# Patient Record
Sex: FEMALE | Race: WHITE | NOT HISPANIC OR LATINO | Employment: UNEMPLOYED | URBAN - METROPOLITAN AREA
[De-identification: names, ages, dates, MRNs, and addresses within clinical notes are randomized per-mention and may not be internally consistent; named-entity substitution may affect disease eponyms.]

---

## 2018-12-10 ENCOUNTER — NURSE ONLY (OUTPATIENT)
Dept: PEDIATRICS | Age: 33
End: 2018-12-10

## 2018-12-10 DIAGNOSIS — Z23 NEED FOR VACCINATION: Primary | ICD-10-CM

## 2018-12-10 PROCEDURE — 90688 IIV4 VACCINE SPLT 0.5 ML IM: CPT | Performed by: PEDIATRICS

## 2020-09-29 ENCOUNTER — INITIAL PRENATAL (OUTPATIENT)
Dept: OBSTETRICS AND GYNECOLOGY | Age: 35
End: 2020-09-29

## 2020-09-29 VITALS — DIASTOLIC BLOOD PRESSURE: 68 MMHG | WEIGHT: 150 LBS | SYSTOLIC BLOOD PRESSURE: 110 MMHG

## 2020-09-29 DIAGNOSIS — K64.4 EXTERNAL HEMORRHOIDS: ICD-10-CM

## 2020-09-29 DIAGNOSIS — R19.5 LOOSE STOOLS: ICD-10-CM

## 2020-09-29 DIAGNOSIS — Z34.81 PRENATAL CARE, SUBSEQUENT PREGNANCY, FIRST TRIMESTER: Primary | ICD-10-CM

## 2020-09-29 LAB
GLUCOSE UR STRIP-MCNC: NEGATIVE MG/DL
PROT UR STRIP-MCNC: NEGATIVE MG/DL

## 2020-09-29 PROCEDURE — 99203 OFFICE O/P NEW LOW 30 MIN: CPT | Performed by: OBSTETRICS & GYNECOLOGY

## 2020-09-29 RX ORDER — PRENATAL VIT NO.126/IRON/FOLIC 28MG-0.8MG
TABLET ORAL DAILY
COMMUNITY

## 2020-09-29 NOTE — PROGRESS NOTES
Chief Complaint   Patient presents with   • Initial Prenatal Visit     NOB:lmp 7/10/20     HPI- Pt is 34 y.o.  at 11w4d here for prenatal visit.     ROS-     - No vaginal bleeding    GI- No abdominal pain    /68   Wt 68 kg (150 lb)   LMP 07/10/2020   Exam - See flow sheet    Fetal heart rate is normal    Assessment-  Diagnoses and all orders for this visit:    Prenatal care, subsequent pregnancy, first trimester  -     Urine Culture - Urine, Urine, Clean Catch  -     POC Urinalysis Dipstick  -     OB Panel With HIV  -     Progesterone  -     HCG, B-subunit, Quantitative  -     IGP, Apt HPV,rfx 16 / 18,45    Other orders  -     prenatal vitamin (prenatal, CLASSIC, vitamin) tablet; Take  by mouth Daily.      Overall healthy 34-year-old multiparous white female who is approximately 11 weeks.  Handheld ultrasound showed fetus with cardiac activity.  We will check new OB lab work continue prenatal vitamins.  Check a first trimester ultrasound at next visit.    Patient's had a lot of gastrointestinal issues for the past year and a half would like to see a gastroenterologist before the pregnancy progresses too far and those referrals have been placed.

## 2020-09-30 ENCOUNTER — TELEPHONE (OUTPATIENT)
Dept: OBSTETRICS AND GYNECOLOGY | Age: 35
End: 2020-09-30

## 2020-09-30 LAB
ABO GROUP BLD: NORMAL
BASOPHILS # BLD AUTO: 0 X10E3/UL (ref 0–0.2)
BASOPHILS NFR BLD AUTO: 0 %
BLD GP AB SCN SERPL QL: NEGATIVE
EOSINOPHIL # BLD AUTO: 0.1 X10E3/UL (ref 0–0.4)
EOSINOPHIL NFR BLD AUTO: 2 %
ERYTHROCYTE [DISTWIDTH] IN BLOOD BY AUTOMATED COUNT: 14.2 % (ref 11.7–15.4)
HBV SURFACE AG SERPL QL IA: NEGATIVE
HCG INTACT+B SERPL-ACNC: NORMAL MIU/ML
HCT VFR BLD AUTO: 36.2 % (ref 34–46.6)
HCV AB S/CO SERPL IA: <0.1 S/CO RATIO (ref 0–0.9)
HGB BLD-MCNC: 12.2 G/DL (ref 11.1–15.9)
HIV 1+2 AB+HIV1 P24 AG SERPL QL IA: NON REACTIVE
IMM GRANULOCYTES # BLD AUTO: 0 X10E3/UL (ref 0–0.1)
IMM GRANULOCYTES NFR BLD AUTO: 0 %
LYMPHOCYTES # BLD AUTO: 1.1 X10E3/UL (ref 0.7–3.1)
LYMPHOCYTES NFR BLD AUTO: 17 %
MCH RBC QN AUTO: 29 PG (ref 26.6–33)
MCHC RBC AUTO-ENTMCNC: 33.7 G/DL (ref 31.5–35.7)
MCV RBC AUTO: 86 FL (ref 79–97)
MONOCYTES # BLD AUTO: 0.5 X10E3/UL (ref 0.1–0.9)
MONOCYTES NFR BLD AUTO: 7 %
NEUTROPHILS # BLD AUTO: 4.9 X10E3/UL (ref 1.4–7)
NEUTROPHILS NFR BLD AUTO: 74 %
PLATELET # BLD AUTO: 202 X10E3/UL (ref 150–450)
PROGEST SERPL-MCNC: 23.9 NG/ML
RBC # BLD AUTO: 4.21 X10E6/UL (ref 3.77–5.28)
RH BLD: POSITIVE
RPR SER QL: NON REACTIVE
RUBV IGG SERPL IA-ACNC: 2.7 INDEX
WBC # BLD AUTO: 6.6 X10E3/UL (ref 3.4–10.8)

## 2020-09-30 NOTE — TELEPHONE ENCOUNTER
----- Message from Basim Mead MD sent at 9/30/2020  2:15 PM EDT -----  Please notify pt. That labs are with in normal limits

## 2020-10-01 LAB
BACTERIA UR CULT: NO GROWTH
BACTERIA UR CULT: NORMAL
CYTOLOGIST CVX/VAG CYTO: NORMAL
CYTOLOGY CVX/VAG DOC CYTO: NORMAL
CYTOLOGY CVX/VAG DOC THIN PREP: NORMAL
DX ICD CODE: NORMAL
HIV 1 & 2 AB SER-IMP: NORMAL
HPV I/H RISK 4 DNA CVX QL PROBE+SIG AMP: NEGATIVE
OTHER STN SPEC: NORMAL
STAT OF ADQ CVX/VAG CYTO-IMP: NORMAL

## 2020-10-28 ENCOUNTER — ROUTINE PRENATAL (OUTPATIENT)
Dept: OBSTETRICS AND GYNECOLOGY | Age: 35
End: 2020-10-28

## 2020-10-28 VITALS — SYSTOLIC BLOOD PRESSURE: 114 MMHG | WEIGHT: 155 LBS | DIASTOLIC BLOOD PRESSURE: 66 MMHG

## 2020-10-28 DIAGNOSIS — Z86.16 HISTORY OF 2019 NOVEL CORONAVIRUS DISEASE (COVID-19): ICD-10-CM

## 2020-10-28 DIAGNOSIS — Z34.82 PRENATAL CARE, SUBSEQUENT PREGNANCY, SECOND TRIMESTER: Primary | ICD-10-CM

## 2020-10-28 LAB
GLUCOSE UR STRIP-MCNC: NEGATIVE MG/DL
PROT UR STRIP-MCNC: NEGATIVE MG/DL

## 2020-10-28 PROCEDURE — 90686 IIV4 VACC NO PRSV 0.5 ML IM: CPT | Performed by: OBSTETRICS & GYNECOLOGY

## 2020-10-28 PROCEDURE — 99213 OFFICE O/P EST LOW 20 MIN: CPT | Performed by: OBSTETRICS & GYNECOLOGY

## 2020-10-28 PROCEDURE — 90471 IMMUNIZATION ADMIN: CPT | Performed by: OBSTETRICS & GYNECOLOGY

## 2020-10-28 NOTE — PROGRESS NOTES
Chief Complaint   Patient presents with   • Routine Prenatal Visit     HPI- Pt is 34 y.o.  at 15w5d here for prenatal visit.     ROS-     - No vaginal bleeding    GI- No abdominal pain    /66   Wt 70.3 kg (155 lb)   LMP 07/10/2020   Exam - See flow sheet    Fetal heart rate is normal    Assessment-  Diagnoses and all orders for this visit:    Prenatal care, subsequent pregnancy, second trimester  -     POC Urinalysis Dipstick    History of 2019 novel coronavirus disease (COVID-19)    Patient is Covid symptoms are very short-lived she is doing well.  Flu shot today.  Reviewed lab work, ultrasound anatomy scan in 4 weeks.  Patient has a gastroenterology appointment to evaluate hemorrhoid history early December

## 2020-11-12 ENCOUNTER — TELEPHONE (OUTPATIENT)
Dept: OBSTETRICS AND GYNECOLOGY | Age: 35
End: 2020-11-12

## 2020-11-12 NOTE — TELEPHONE ENCOUNTER
Dr. Mead OB pt is to bee seen in four week from last appointment which was 10/28/2020.  However there are no appointments available.  Please advise on scheduling.    [phone]

## 2020-12-03 ENCOUNTER — PROCEDURE VISIT (OUTPATIENT)
Dept: OBSTETRICS AND GYNECOLOGY | Age: 35
End: 2020-12-03

## 2020-12-03 ENCOUNTER — ROUTINE PRENATAL (OUTPATIENT)
Dept: OBSTETRICS AND GYNECOLOGY | Age: 35
End: 2020-12-03

## 2020-12-03 VITALS — WEIGHT: 169 LBS | SYSTOLIC BLOOD PRESSURE: 112 MMHG | DIASTOLIC BLOOD PRESSURE: 70 MMHG

## 2020-12-03 DIAGNOSIS — O09.522 MULTIGRAVIDA OF ADVANCED MATERNAL AGE IN SECOND TRIMESTER: ICD-10-CM

## 2020-12-03 DIAGNOSIS — Z36.89 ENCOUNTER FOR FETAL ANATOMIC SURVEY: Primary | ICD-10-CM

## 2020-12-03 DIAGNOSIS — Z3A.20 20 WEEKS GESTATION OF PREGNANCY: Primary | ICD-10-CM

## 2020-12-03 DIAGNOSIS — Z13.89 SCREENING FOR BLOOD OR PROTEIN IN URINE: ICD-10-CM

## 2020-12-03 DIAGNOSIS — Z36.86 ENCOUNTER FOR ANTENATAL SCREENING FOR CERVICAL LENGTH: ICD-10-CM

## 2020-12-03 LAB
BILIRUB BLD-MCNC: NEGATIVE MG/DL
CLARITY, POC: CLEAR
COLOR UR: YELLOW
GLUCOSE UR STRIP-MCNC: NEGATIVE MG/DL
KETONES UR QL: NEGATIVE
LEUKOCYTE EST, POC: NEGATIVE
NITRITE UR-MCNC: NEGATIVE MG/ML
PH UR: 7 [PH] (ref 5–8)
PROT UR STRIP-MCNC: NEGATIVE MG/DL
RBC # UR STRIP: NEGATIVE /UL
SP GR UR: 1.01 (ref 1–1.03)
UROBILINOGEN UR QL: NORMAL

## 2020-12-03 PROCEDURE — 76805 OB US >/= 14 WKS SNGL FETUS: CPT | Performed by: PHYSICIAN ASSISTANT

## 2020-12-03 PROCEDURE — 76817 TRANSVAGINAL US OBSTETRIC: CPT | Performed by: PHYSICIAN ASSISTANT

## 2020-12-03 PROCEDURE — 99213 OFFICE O/P EST LOW 20 MIN: CPT | Performed by: PHYSICIAN ASSISTANT

## 2020-12-03 NOTE — PROGRESS NOTES
Chief Complaint   Patient presents with   • Routine Prenatal Visit       HPI: 35 y.o.  at 20w6d gestation  She is here for her anatomy scan  Doing well and has no c/o  Anatomy scan done but unable to see OFT and facial profile  Recommend repeat anatomy scan anv  Placenta is anterior with marginal insertion  Pt has received flu shot  Will plan 1 hour gtt anv as well  All questions answered    Vitals:    20 0935   BP: 112/70   Weight: 76.7 kg (169 lb)       ROS:  GI:  Negative  : NA  Pulmonary: Negative     A/P  1. Intrauterine pregnancy at 20w6d   2. Pregnancy Risk:  NORMAL    Diagnoses and all orders for this visit:    1. 20 weeks gestation of pregnancy (Primary)  -     POC Urinalysis Dipstick, Multipro    2. Screening for blood or protein in urine  -     POC Urinalysis Dipstick, Multipro        -----------------------  PLAN:   Return in about 4 weeks (around 2020) for schedule rpt anatomy scan and 1 hour gtt.      JASPER Barnett  12/3/2020 10:51 EST

## 2020-12-17 ENCOUNTER — OFFICE VISIT (OUTPATIENT)
Dept: GASTROENTEROLOGY | Facility: CLINIC | Age: 35
End: 2020-12-17

## 2020-12-17 VITALS — BODY MASS INDEX: 27.58 KG/M2 | HEIGHT: 66 IN | TEMPERATURE: 98.6 F | WEIGHT: 171.6 LBS

## 2020-12-17 DIAGNOSIS — K62.5 GASTROINTESTINAL HEMORRHAGE ASSOCIATED WITH ANORECTAL SOURCE: Primary | ICD-10-CM

## 2020-12-17 DIAGNOSIS — K64.1 GRADE II HEMORRHOIDS: ICD-10-CM

## 2020-12-17 PROCEDURE — 99203 OFFICE O/P NEW LOW 30 MIN: CPT | Performed by: INTERNAL MEDICINE

## 2020-12-17 RX ORDER — CALCIUM CARBONATE 200(500)MG
1 TABLET,CHEWABLE ORAL AS NEEDED
COMMUNITY
End: 2022-05-11

## 2020-12-17 RX ORDER — HYDROCORTISONE 25 MG/G
CREAM TOPICAL 2 TIMES DAILY
Qty: 28 G | Refills: 5 | Status: SHIPPED | OUTPATIENT
Start: 2020-12-17 | End: 2020-12-27

## 2020-12-17 NOTE — PROGRESS NOTES
Chief Complaint   Patient presents with   • Constipation   • Rectal Bleeding   • Hemorrhoids     Adrienne Lezama is a 35 y.o. female who presents with a history of rectal bleeding and hemorrhoids  HPI     Patient 35-year-old female with history of chronic constipation treating it with MiraLAX daily.  Patient now 20 weeks pregnant complaining of recurrent bleeding with bowel movements.  Patient reports now having 4-5 bowel movements a day taking her 1 dose of MiraLAX daily.  With this she has urgency and blood on the tissue and occasionally significant blood in the toilet.  Patient denies any melena and does not feel otherwise ill.  Patient labs show normal hemoglobin normal white count.  Patient here for further recommendations.  Patient does report if she is out and can get to a bathroom to get her 4-5 bowel movements a day she starts developing headaches.    History reviewed. No pertinent past medical history.    Current Outpatient Medications:   •  calcium carbonate (TUMS) 500 MG chewable tablet, Chew 1 tablet As Needed for Indigestion or Heartburn., Disp: , Rfl:   •  Polyethylene Glycol 3350 (MIRALAX PO), Take  by mouth As Needed., Disp: , Rfl:   •  prenatal vitamin (prenatal, CLASSIC, vitamin) tablet, Take  by mouth Daily., Disp: , Rfl:   •  Hydrocortisone, Perianal, (ANUSOL-HC) 2.5 % rectal cream, Insert  into the rectum 2 (Two) Times a Day for 10 days., Disp: 28 g, Rfl: 5  No Known Allergies  Social History     Socioeconomic History   • Marital status: Single     Spouse name: Not on file   • Number of children: Not on file   • Years of education: Not on file   • Highest education level: Not on file   Tobacco Use   • Smoking status: Never Smoker   • Smokeless tobacco: Never Used   Substance and Sexual Activity   • Alcohol use: Not Currently     Comment: social otherwise    • Drug use: Never     Family History   Problem Relation Age of Onset   • Colon cancer Paternal Grandmother      Review of Systems    Constitutional: Negative.    HENT: Negative.    Eyes: Negative.    Respiratory: Negative.    Cardiovascular: Negative.    Gastrointestinal: Negative.    Endocrine: Negative.    Musculoskeletal: Negative.    Skin: Negative.    Allergic/Immunologic: Negative.    Hematological: Negative.      Vitals:    12/17/20 0937   Temp: 98.6 °F (37 °C)     Physical Exam  Vitals signs and nursing note reviewed.   Constitutional:       Appearance: Normal appearance. She is well-developed and normal weight.      Comments: Gravid uterus   HENT:      Head: Normocephalic and atraumatic.   Eyes:      General: No scleral icterus.     Pupils: Pupils are equal, round, and reactive to light.   Neck:      Musculoskeletal: Normal range of motion and neck supple. No neck rigidity.   Cardiovascular:      Rate and Rhythm: Normal rate and regular rhythm.      Heart sounds: Normal heart sounds.   Pulmonary:      Effort: Pulmonary effort is normal.      Breath sounds: Normal breath sounds. No wheezing or rales.   Abdominal:      General: Bowel sounds are normal. There is no distension or abdominal bruit.      Palpations: Abdomen is soft. Abdomen is not rigid. There is no shifting dullness, fluid wave, mass or pulsatile mass.      Tenderness: There is no abdominal tenderness. There is no guarding.      Hernia: No hernia is present.   Musculoskeletal: Normal range of motion.         General: No swelling or tenderness.   Skin:     General: Skin is warm and dry.      Coloration: Skin is not jaundiced.      Findings: No rash.   Neurological:      General: No focal deficit present.      Mental Status: She is alert and oriented to person, place, and time.   Psychiatric:         Behavior: Behavior normal.         Thought Content: Thought content normal.       Diagnoses and all orders for this visit:    Gastrointestinal hemorrhage associated with anorectal source    Grade II hemorrhoids    Other orders  -     calcium carbonate (TUMS) 500 MG chewable  tablet; Chew 1 tablet As Needed for Indigestion or Heartburn.  -     Polyethylene Glycol 3350 (MIRALAX PO); Take  by mouth As Needed.  -     Hydrocortisone, Perianal, (ANUSOL-HC) 2.5 % rectal cream; Insert  into the rectum 2 (Two) Times a Day for 10 days.    Patient 35-year-old female who is approximately 20 weeks pregnant with her fourth child presenting with anorectal bleeding related to hemorrhoids.  Patient denies any nausea vomiting but has suffered with chronic constipation most of her life.  Patient taking MiraLAX 17 g daily having up to 4-5 bowel movements a day since pregnancy.  Patient does report if she cannot get to the bathroom she starts getting headaches, recommended to follow-up with GYN as to what safe to take during pregnancy for headache.  For now would recommend patient decrease the MiraLAX to one half dose daily, discussed with patient.  Use Anusol 2.5% twice daily for 10 days sparingly.  We will follow-up after delivery to evaluate for possible therapy for hemorrhoids.

## 2021-01-05 ENCOUNTER — ROUTINE PRENATAL (OUTPATIENT)
Dept: OBSTETRICS AND GYNECOLOGY | Age: 36
End: 2021-01-05

## 2021-01-05 VITALS — SYSTOLIC BLOOD PRESSURE: 122 MMHG | DIASTOLIC BLOOD PRESSURE: 70 MMHG | WEIGHT: 172 LBS | BODY MASS INDEX: 27.76 KG/M2

## 2021-01-05 DIAGNOSIS — Z13.1 SCREENING FOR DIABETES MELLITUS: ICD-10-CM

## 2021-01-05 DIAGNOSIS — O09.522 MULTIGRAVIDA OF ADVANCED MATERNAL AGE IN SECOND TRIMESTER: ICD-10-CM

## 2021-01-05 DIAGNOSIS — Z13.89 SCREENING FOR BLOOD OR PROTEIN IN URINE: Primary | ICD-10-CM

## 2021-01-05 DIAGNOSIS — Z3A.25 25 WEEKS GESTATION OF PREGNANCY: ICD-10-CM

## 2021-01-05 LAB
BASOPHILS # BLD AUTO: 0.01 10*3/MM3 (ref 0–0.2)
BASOPHILS NFR BLD AUTO: 0.2 % (ref 0–1.5)
BILIRUB BLD-MCNC: NEGATIVE MG/DL
CLARITY, POC: CLEAR
COLOR UR: YELLOW
EOSINOPHIL # BLD AUTO: 0.09 10*3/MM3 (ref 0–0.4)
EOSINOPHIL NFR BLD AUTO: 1.7 % (ref 0.3–6.2)
ERYTHROCYTE [DISTWIDTH] IN BLOOD BY AUTOMATED COUNT: 12.7 % (ref 12.3–15.4)
GLUCOSE 1H P 50 G GLC PO SERPL-MCNC: 86 MG/DL (ref 65–139)
GLUCOSE UR STRIP-MCNC: NEGATIVE MG/DL
HCT VFR BLD AUTO: 30.2 % (ref 34–46.6)
HGB BLD-MCNC: 10.1 G/DL (ref 12–15.9)
IMM GRANULOCYTES # BLD AUTO: 0.04 10*3/MM3 (ref 0–0.05)
IMM GRANULOCYTES NFR BLD AUTO: 0.8 % (ref 0–0.5)
KETONES UR QL: NEGATIVE
LEUKOCYTE EST, POC: NEGATIVE
LYMPHOCYTES # BLD AUTO: 0.82 10*3/MM3 (ref 0.7–3.1)
LYMPHOCYTES NFR BLD AUTO: 15.7 % (ref 19.6–45.3)
MCH RBC QN AUTO: 28.9 PG (ref 26.6–33)
MCHC RBC AUTO-ENTMCNC: 33.4 G/DL (ref 31.5–35.7)
MCV RBC AUTO: 86.3 FL (ref 79–97)
MONOCYTES # BLD AUTO: 0.35 10*3/MM3 (ref 0.1–0.9)
MONOCYTES NFR BLD AUTO: 6.7 % (ref 5–12)
NEUTROPHILS # BLD AUTO: 3.91 10*3/MM3 (ref 1.7–7)
NEUTROPHILS NFR BLD AUTO: 74.9 % (ref 42.7–76)
NITRITE UR-MCNC: NEGATIVE MG/ML
NRBC BLD AUTO-RTO: 0 /100 WBC (ref 0–0.2)
PH UR: 7 [PH] (ref 5–8)
PLATELET # BLD AUTO: 181 10*3/MM3 (ref 140–450)
PROT UR STRIP-MCNC: NEGATIVE MG/DL
RBC # BLD AUTO: 3.5 10*6/MM3 (ref 3.77–5.28)
RBC # UR STRIP: ABNORMAL /UL
SP GR UR: 1.02 (ref 1–1.03)
UROBILINOGEN UR QL: NORMAL
WBC # BLD AUTO: 5.22 10*3/MM3 (ref 3.4–10.8)

## 2021-01-05 PROCEDURE — 99213 OFFICE O/P EST LOW 20 MIN: CPT | Performed by: PHYSICIAN ASSISTANT

## 2021-01-05 NOTE — PROGRESS NOTES
Chief Complaint   Patient presents with   • Routine Prenatal Visit     one hour gtt       HPI: 35 y.o.  at 25w4d gestation  She is doing well  Has good FM  No c/o  Here for rpt anatomy  U/s done and all anatomy seen and good FHT noted  1 hour gtt done today  Will f/u in 4 wks for belly check and Tdap  Call for any problems    Vitals:    21 0932   BP: 122/70   Weight: 78 kg (172 lb)       ROS:  GI:  Negative  : na  Pulmonary: Negative     A/P  1. Intrauterine pregnancy at 25w4d   2. Pregnancy Risk:  HIGH RISK    Diagnoses and all orders for this visit:    1. Screening for blood or protein in urine (Primary)  -     POC Urinalysis Dipstick, Multipro    2. Screening for diabetes mellitus  -     Gestational Screen 1 Hr ([facility])  -     CBC & Differential    3. 25 weeks gestation of pregnancy  -     Gestational Screen 1 Hr ([facility])  -     CBC & Differential        -----------------------  PLAN:   Return in about 4 weeks (around 2021) for belly check with rina Dolan due as well.      JASPER Barnett  2021 10:13 EST

## 2021-02-03 ENCOUNTER — ROUTINE PRENATAL (OUTPATIENT)
Dept: OBSTETRICS AND GYNECOLOGY | Age: 36
End: 2021-02-03

## 2021-02-03 VITALS — BODY MASS INDEX: 28.25 KG/M2 | SYSTOLIC BLOOD PRESSURE: 110 MMHG | DIASTOLIC BLOOD PRESSURE: 70 MMHG | WEIGHT: 175 LBS

## 2021-02-03 DIAGNOSIS — Z34.82 PRENATAL CARE, SUBSEQUENT PREGNANCY, SECOND TRIMESTER: Primary | ICD-10-CM

## 2021-02-03 LAB
GLUCOSE UR STRIP-MCNC: NEGATIVE MG/DL
PROT UR STRIP-MCNC: ABNORMAL MG/DL

## 2021-02-03 PROCEDURE — 99213 OFFICE O/P EST LOW 20 MIN: CPT | Performed by: OBSTETRICS & GYNECOLOGY

## 2021-02-03 PROCEDURE — 90715 TDAP VACCINE 7 YRS/> IM: CPT | Performed by: OBSTETRICS & GYNECOLOGY

## 2021-02-03 PROCEDURE — 90471 IMMUNIZATION ADMIN: CPT | Performed by: OBSTETRICS & GYNECOLOGY

## 2021-02-03 NOTE — PROGRESS NOTES
Chief Complaint   Patient presents with   • Routine Prenatal Visit     HPI- Pt is 35 y.o.  at 29w5d here for prenatal visit.     ROS-     - No vaginal bleeding    GI- No abdominal pain    /70   Wt 79.4 kg (175 lb)   LMP 07/10/2020   BMI 28.25 kg/m²   Exam - See flow sheet    Fetal heart rate is normal    Assessment-  Diagnoses and all orders for this visit:    Prenatal care, subsequent pregnancy, second trimester  -     POC Urinalysis Dipstick    Other orders  -     Tdap Vaccine Greater Than or Equal To 6yo IM    Patient received pertussis today, will continue taking iron, discussed due date of the .

## 2021-03-03 ENCOUNTER — ROUTINE PRENATAL (OUTPATIENT)
Dept: OBSTETRICS AND GYNECOLOGY | Age: 36
End: 2021-03-03

## 2021-03-03 VITALS — BODY MASS INDEX: 29.21 KG/M2 | WEIGHT: 181 LBS | DIASTOLIC BLOOD PRESSURE: 68 MMHG | SYSTOLIC BLOOD PRESSURE: 110 MMHG

## 2021-03-03 DIAGNOSIS — Z34.83 PRENATAL CARE, SUBSEQUENT PREGNANCY, THIRD TRIMESTER: Primary | ICD-10-CM

## 2021-03-03 LAB
GLUCOSE UR STRIP-MCNC: NEGATIVE MG/DL
PROT UR STRIP-MCNC: NEGATIVE MG/DL

## 2021-03-03 PROCEDURE — 99213 OFFICE O/P EST LOW 20 MIN: CPT | Performed by: OBSTETRICS & GYNECOLOGY

## 2021-03-03 NOTE — PROGRESS NOTES
Patient appears to be doing well  Good fetal activity, occasional Virden Ochoa contraction  Her 7-year-old daughter broke her leg but is doing okay  Physical exam reassuring signs symptoms of labor reviewed, patient wants to proceed with natural childbirth  BPP starting at 36 weeks

## 2021-03-17 ENCOUNTER — ROUTINE PRENATAL (OUTPATIENT)
Dept: OBSTETRICS AND GYNECOLOGY | Age: 36
End: 2021-03-17

## 2021-03-17 VITALS — BODY MASS INDEX: 29.38 KG/M2 | WEIGHT: 182 LBS | SYSTOLIC BLOOD PRESSURE: 118 MMHG | DIASTOLIC BLOOD PRESSURE: 70 MMHG

## 2021-03-17 DIAGNOSIS — Z34.83 PRENATAL CARE, SUBSEQUENT PREGNANCY, THIRD TRIMESTER: Primary | ICD-10-CM

## 2021-03-17 LAB
GLUCOSE UR STRIP-MCNC: NEGATIVE MG/DL
PROT UR STRIP-MCNC: NEGATIVE MG/DL

## 2021-03-17 PROCEDURE — 99213 OFFICE O/P EST LOW 20 MIN: CPT | Performed by: OBSTETRICS & GYNECOLOGY

## 2021-03-17 NOTE — PROGRESS NOTES
Chief Complaint   Patient presents with   • Routine Prenatal Visit     GBS     HPI- Pt is 35 y.o.  at 35w5d here for prenatal visit.     ROS-     - No vaginal bleeding    GI- No abdominal pain    /70   Wt 82.6 kg (182 lb)   LMP 07/10/2020   BMI 29.38 kg/m²   Exam - See flow sheet    Fetal heart rate is normal    Assessment-  Diagnoses and all orders for this visit:    Prenatal care, subsequent pregnancy, third trimester  -     POC Urinalysis Dipstick  -     Strep B Screen - Swab, Vaginal/Rectum    Cervix long thick and closed, GBS collected, start BPP's next week

## 2021-03-19 LAB — GP B STREP DNA SPEC QL NAA+PROBE: NEGATIVE

## 2021-03-24 ENCOUNTER — ROUTINE PRENATAL (OUTPATIENT)
Dept: OBSTETRICS AND GYNECOLOGY | Age: 36
End: 2021-03-24

## 2021-03-24 VITALS — SYSTOLIC BLOOD PRESSURE: 110 MMHG | BODY MASS INDEX: 29.7 KG/M2 | DIASTOLIC BLOOD PRESSURE: 60 MMHG | WEIGHT: 184 LBS

## 2021-03-24 DIAGNOSIS — Z34.83 PRENATAL CARE, SUBSEQUENT PREGNANCY, THIRD TRIMESTER: Primary | ICD-10-CM

## 2021-03-24 LAB
GLUCOSE UR STRIP-MCNC: NEGATIVE MG/DL
PROT UR STRIP-MCNC: NEGATIVE MG/DL

## 2021-03-24 PROCEDURE — 99213 OFFICE O/P EST LOW 20 MIN: CPT | Performed by: OBSTETRICS & GYNECOLOGY

## 2021-03-24 NOTE — PROGRESS NOTES
Chief Complaint   Patient presents with   • Pregnancy Ultrasound     HPI- Pt is 35 y.o.  at 36w5d here for prenatal visit.     ROS-     - No vaginal bleeding    GI- No abdominal pain    /60   Wt 83.5 kg (184 lb)   LMP 07/10/2020   BMI 29.70 kg/m²   Exam - See flow sheet    Fetal heart rate is normal    Assessment-  Diagnoses and all orders for this visit:    Prenatal care, subsequent pregnancy, third trimester  -     POC Urinalysis Dipstick    Patient reports good fetal movement, BPP 8 out of 8, cervix long thick and closed, GBS negative, signs symptoms labor reviewed

## 2021-03-31 ENCOUNTER — ROUTINE PRENATAL (OUTPATIENT)
Dept: OBSTETRICS AND GYNECOLOGY | Age: 36
End: 2021-03-31

## 2021-03-31 VITALS — BODY MASS INDEX: 29.54 KG/M2 | WEIGHT: 183 LBS | SYSTOLIC BLOOD PRESSURE: 110 MMHG | DIASTOLIC BLOOD PRESSURE: 62 MMHG

## 2021-03-31 DIAGNOSIS — Z34.83 PRENATAL CARE, SUBSEQUENT PREGNANCY, THIRD TRIMESTER: Primary | ICD-10-CM

## 2021-03-31 LAB
GLUCOSE UR STRIP-MCNC: NEGATIVE MG/DL
PROT UR STRIP-MCNC: ABNORMAL MG/DL

## 2021-03-31 PROCEDURE — 99213 OFFICE O/P EST LOW 20 MIN: CPT | Performed by: OBSTETRICS & GYNECOLOGY

## 2021-03-31 NOTE — PROGRESS NOTES
Chief Complaint   Patient presents with   • Pregnancy Ultrasound     HPI- Pt is 35 y.o.  at 37w5d here for prenatal visit.     ROS-     - No vaginal bleeding    GI- No abdominal pain    /62   Wt 83 kg (183 lb)   LMP 07/10/2020   BMI 29.54 kg/m²   Exam - See flow sheet    Fetal heart rate is normal    Assessment-  Diagnoses and all orders for this visit:    Prenatal care, subsequent pregnancy, third trimester  -     POC Urinalysis Dipstick    Advanced maternal age     today's BPP 8 out of 8, vertex  Cervix 1 cm 50% -2 stretchy  Signs symptoms of labor reviewed  Patient declines induction

## 2021-04-07 ENCOUNTER — ROUTINE PRENATAL (OUTPATIENT)
Dept: OBSTETRICS AND GYNECOLOGY | Age: 36
End: 2021-04-07

## 2021-04-07 VITALS — BODY MASS INDEX: 29.54 KG/M2 | WEIGHT: 183 LBS | SYSTOLIC BLOOD PRESSURE: 104 MMHG | DIASTOLIC BLOOD PRESSURE: 64 MMHG

## 2021-04-07 DIAGNOSIS — O09.523 MULTIGRAVIDA OF ADVANCED MATERNAL AGE IN THIRD TRIMESTER: Primary | ICD-10-CM

## 2021-04-07 DIAGNOSIS — Z3A.38 38 WEEKS GESTATION OF PREGNANCY: ICD-10-CM

## 2021-04-07 DIAGNOSIS — Z13.89 SCREENING FOR HEMATURIA OR PROTEINURIA: ICD-10-CM

## 2021-04-07 DIAGNOSIS — O99.019 MATERNAL ANEMIA IN PREGNANCY, ANTEPARTUM: ICD-10-CM

## 2021-04-07 LAB
GLUCOSE UR STRIP-MCNC: NEGATIVE MG/DL
PROT UR STRIP-MCNC: NEGATIVE MG/DL

## 2021-04-07 PROCEDURE — 99213 OFFICE O/P EST LOW 20 MIN: CPT | Performed by: OBSTETRICS & GYNECOLOGY

## 2021-04-07 RX ORDER — DOXYCYCLINE HYCLATE 50 MG/1
324 CAPSULE, GELATIN COATED ORAL
COMMUNITY

## 2021-04-07 NOTE — PROGRESS NOTES
Chief Complaint   Patient presents with   • Routine Prenatal Visit     BPP WITH DW TODAY AT 11:30.  GBS NEG ON 2021. REPORTS GFM.      HPI- Pt is 35 y.o.  at 38w5d here for prenatal visit.     ROS-     - No vaginal bleeding    GI- No abdominal pain    /64   Wt 83 kg (183 lb)   LMP 07/10/2020   BMI 29.54 kg/m²   Exam - See flow sheet    Fetal heart rate is normal    Assessment-  Diagnoses and all orders for this visit:    Multigravida of advanced maternal age in third trimester  -     POC Urinalysis Dipstick    38 weeks gestation of pregnancy  -     POC Urinalysis Dipstick    Screening for hematuria or proteinuria  -     POC Urinalysis Dipstick    Maternal anemia in pregnancy, antepartum    Other orders  -     ferrous gluconate (FERGON) 324 MG tablet; Take 324 mg by mouth Daily With Breakfast.    Patient doing well, cervix unchanged, BPP 8 out of 8, patient declines induction again.  Signs symptoms labor reviewed, we will repeat BPP next week if patient has not spontaneously delivered  Reviewed birth plans, patient continues to want to go naturally.

## 2021-04-10 ENCOUNTER — ANESTHESIA EVENT (OUTPATIENT)
Dept: LABOR AND DELIVERY | Facility: HOSPITAL | Age: 36
End: 2021-04-10

## 2021-04-10 ENCOUNTER — HOSPITAL ENCOUNTER (INPATIENT)
Facility: HOSPITAL | Age: 36
LOS: 2 days | Discharge: HOME | End: 2021-04-12
Attending: OBSTETRICS & GYNECOLOGY | Admitting: OBSTETRICS & GYNECOLOGY

## 2021-04-10 ENCOUNTER — ANESTHESIA (OUTPATIENT)
Dept: LABOR AND DELIVERY | Facility: HOSPITAL | Age: 36
End: 2021-04-10

## 2021-04-10 PROBLEM — Z34.90 PREGNANCY: Status: ACTIVE | Noted: 2021-04-10

## 2021-04-10 LAB
ABO GROUP BLD: NORMAL
BLD GP AB SCN SERPL QL: NEGATIVE
DEPRECATED RDW RBC AUTO: 41.2 FL (ref 37–54)
ERYTHROCYTE [DISTWIDTH] IN BLOOD BY AUTOMATED COUNT: 14.4 % (ref 12.3–15.4)
HCT VFR BLD AUTO: 32.8 % (ref 34–46.6)
HGB BLD-MCNC: 10.6 G/DL (ref 12–15.9)
MCH RBC QN AUTO: 25.7 PG (ref 26.6–33)
MCHC RBC AUTO-ENTMCNC: 32.3 G/DL (ref 31.5–35.7)
MCV RBC AUTO: 79.4 FL (ref 79–97)
PLATELET # BLD AUTO: 181 10*3/MM3 (ref 140–450)
PMV BLD AUTO: 10 FL (ref 6–12)
RBC # BLD AUTO: 4.13 10*6/MM3 (ref 3.77–5.28)
RH BLD: POSITIVE
SARS-COV-2 N GENE NPH QL NAA+PROBE: NOT DETECTED
T&S EXPIRATION DATE: NORMAL
WBC # BLD AUTO: 7.99 10*3/MM3 (ref 3.4–10.8)

## 2021-04-10 PROCEDURE — 85027 COMPLETE CBC AUTOMATED: CPT | Performed by: OBSTETRICS & GYNECOLOGY

## 2021-04-10 PROCEDURE — 25010000002 BUTORPHANOL PER 1 MG: Performed by: OBSTETRICS & GYNECOLOGY

## 2021-04-10 PROCEDURE — 59409 OBSTETRICAL CARE: CPT | Performed by: OBSTETRICS & GYNECOLOGY

## 2021-04-10 PROCEDURE — U0003 INFECTIOUS AGENT DETECTION BY NUCLEIC ACID (DNA OR RNA); SEVERE ACUTE RESPIRATORY SYNDROME CORONAVIRUS 2 (SARS-COV-2) (CORONAVIRUS DISEASE [COVID-19]), AMPLIFIED PROBE TECHNIQUE, MAKING USE OF HIGH THROUGHPUT TECHNOLOGIES AS DESCRIBED BY CMS-2020-01-R: HCPCS | Performed by: OBSTETRICS & GYNECOLOGY

## 2021-04-10 PROCEDURE — 25010000002 METHYLERGONOVINE MALEATE PER 0.2 MG: Performed by: OBSTETRICS & GYNECOLOGY

## 2021-04-10 PROCEDURE — 86850 RBC ANTIBODY SCREEN: CPT | Performed by: OBSTETRICS & GYNECOLOGY

## 2021-04-10 PROCEDURE — 86900 BLOOD TYPING SEROLOGIC ABO: CPT | Performed by: OBSTETRICS & GYNECOLOGY

## 2021-04-10 PROCEDURE — 86901 BLOOD TYPING SEROLOGIC RH(D): CPT | Performed by: OBSTETRICS & GYNECOLOGY

## 2021-04-10 RX ORDER — ONDANSETRON 2 MG/ML
4 INJECTION INTRAMUSCULAR; INTRAVENOUS EVERY 6 HOURS PRN
Status: DISCONTINUED | OUTPATIENT
Start: 2021-04-10 | End: 2021-04-10

## 2021-04-10 RX ORDER — HYDROCORTISONE 25 MG/G
1 CREAM TOPICAL AS NEEDED
Status: DISCONTINUED | OUTPATIENT
Start: 2021-04-10 | End: 2021-04-12 | Stop reason: HOSPADM

## 2021-04-10 RX ORDER — IBUPROFEN 600 MG/1
600 TABLET ORAL EVERY 6 HOURS SCHEDULED
Status: DISCONTINUED | OUTPATIENT
Start: 2021-04-10 | End: 2021-04-12 | Stop reason: HOSPADM

## 2021-04-10 RX ORDER — TRANEXAMIC ACID 100 MG/ML
1000 INJECTION, SOLUTION INTRAVENOUS ONCE AS NEEDED
Status: DISCONTINUED | OUTPATIENT
Start: 2021-04-10 | End: 2021-04-10

## 2021-04-10 RX ORDER — PHYTONADIONE 1 MG/.5ML
INJECTION, EMULSION INTRAMUSCULAR; INTRAVENOUS; SUBCUTANEOUS
Status: DISPENSED
Start: 2021-04-10 | End: 2021-04-10

## 2021-04-10 RX ORDER — EPHEDRINE SULFATE 50 MG/ML
5 INJECTION, SOLUTION INTRAVENOUS AS NEEDED
Status: DISCONTINUED | OUTPATIENT
Start: 2021-04-10 | End: 2021-04-10

## 2021-04-10 RX ORDER — OXYCODONE HYDROCHLORIDE AND ACETAMINOPHEN 5; 325 MG/1; MG/1
1 TABLET ORAL EVERY 4 HOURS PRN
Status: DISCONTINUED | OUTPATIENT
Start: 2021-04-10 | End: 2021-04-12 | Stop reason: HOSPADM

## 2021-04-10 RX ORDER — FAMOTIDINE 20 MG/1
20 TABLET, FILM COATED ORAL 2 TIMES DAILY PRN
Status: DISCONTINUED | OUTPATIENT
Start: 2021-04-10 | End: 2021-04-10

## 2021-04-10 RX ORDER — OXYTOCIN-SODIUM CHLORIDE 0.9% IV SOLN 30 UNIT/500ML 30-0.9/5 UT/ML-%
250 SOLUTION INTRAVENOUS CONTINUOUS PRN
Status: ACTIVE | OUTPATIENT
Start: 2021-04-10 | End: 2021-04-10

## 2021-04-10 RX ORDER — MAGNESIUM CARB/ALUMINUM HYDROX 105-160MG
30 TABLET,CHEWABLE ORAL ONCE AS NEEDED
Status: DISCONTINUED | OUTPATIENT
Start: 2021-04-10 | End: 2021-04-10

## 2021-04-10 RX ORDER — OXYTOCIN-SODIUM CHLORIDE 0.9% IV SOLN 30 UNIT/500ML 30-0.9/5 UT/ML-%
999 SOLUTION INTRAVENOUS ONCE
Status: COMPLETED | OUTPATIENT
Start: 2021-04-10 | End: 2021-04-10

## 2021-04-10 RX ORDER — ERYTHROMYCIN 5 MG/G
OINTMENT OPHTHALMIC
Status: DISPENSED
Start: 2021-04-10 | End: 2021-04-10

## 2021-04-10 RX ORDER — SODIUM CHLORIDE, SODIUM LACTATE, POTASSIUM CHLORIDE, CALCIUM CHLORIDE 600; 310; 30; 20 MG/100ML; MG/100ML; MG/100ML; MG/100ML
125 INJECTION, SOLUTION INTRAVENOUS CONTINUOUS
Status: DISCONTINUED | OUTPATIENT
Start: 2021-04-10 | End: 2021-04-10

## 2021-04-10 RX ORDER — DOCUSATE SODIUM 100 MG/1
100 CAPSULE, LIQUID FILLED ORAL 2 TIMES DAILY
Status: DISCONTINUED | OUTPATIENT
Start: 2021-04-10 | End: 2021-04-12 | Stop reason: HOSPADM

## 2021-04-10 RX ORDER — FAMOTIDINE 10 MG/ML
20 INJECTION, SOLUTION INTRAVENOUS ONCE AS NEEDED
Status: DISCONTINUED | OUTPATIENT
Start: 2021-04-10 | End: 2021-04-10

## 2021-04-10 RX ORDER — PROMETHAZINE HYDROCHLORIDE 12.5 MG/1
12.5 TABLET ORAL EVERY 4 HOURS PRN
Status: DISCONTINUED | OUTPATIENT
Start: 2021-04-10 | End: 2021-04-12 | Stop reason: HOSPADM

## 2021-04-10 RX ORDER — ONDANSETRON 2 MG/ML
4 INJECTION INTRAMUSCULAR; INTRAVENOUS ONCE AS NEEDED
Status: DISCONTINUED | OUTPATIENT
Start: 2021-04-10 | End: 2021-04-10

## 2021-04-10 RX ORDER — ACETAMINOPHEN 325 MG/1
650 TABLET ORAL EVERY 4 HOURS PRN
Status: DISCONTINUED | OUTPATIENT
Start: 2021-04-10 | End: 2021-04-10

## 2021-04-10 RX ORDER — SODIUM CHLORIDE 0.9 % (FLUSH) 0.9 %
1-10 SYRINGE (ML) INJECTION AS NEEDED
Status: DISCONTINUED | OUTPATIENT
Start: 2021-04-10 | End: 2021-04-12 | Stop reason: HOSPADM

## 2021-04-10 RX ORDER — ONDANSETRON 4 MG/1
4 TABLET, FILM COATED ORAL EVERY 8 HOURS PRN
Status: DISCONTINUED | OUTPATIENT
Start: 2021-04-10 | End: 2021-04-12 | Stop reason: HOSPADM

## 2021-04-10 RX ORDER — TERBUTALINE SULFATE 1 MG/ML
0.25 INJECTION, SOLUTION SUBCUTANEOUS AS NEEDED
Status: DISCONTINUED | OUTPATIENT
Start: 2021-04-10 | End: 2021-04-10

## 2021-04-10 RX ORDER — OXYTOCIN-SODIUM CHLORIDE 0.9% IV SOLN 30 UNIT/500ML 30-0.9/5 UT/ML-%
125 SOLUTION INTRAVENOUS CONTINUOUS PRN
Status: COMPLETED | OUTPATIENT
Start: 2021-04-10 | End: 2021-04-10

## 2021-04-10 RX ORDER — METHYLERGONOVINE MALEATE 0.2 MG/ML
200 INJECTION INTRAVENOUS ONCE AS NEEDED
Status: COMPLETED | OUTPATIENT
Start: 2021-04-10 | End: 2021-04-10

## 2021-04-10 RX ORDER — BISACODYL 10 MG
10 SUPPOSITORY, RECTAL RECTAL DAILY PRN
Status: DISCONTINUED | OUTPATIENT
Start: 2021-04-11 | End: 2021-04-12 | Stop reason: HOSPADM

## 2021-04-10 RX ORDER — DIPHENHYDRAMINE HYDROCHLORIDE 50 MG/ML
12.5 INJECTION INTRAMUSCULAR; INTRAVENOUS EVERY 8 HOURS PRN
Status: DISCONTINUED | OUTPATIENT
Start: 2021-04-10 | End: 2021-04-10

## 2021-04-10 RX ORDER — MISOPROSTOL 200 UG/1
800 TABLET ORAL AS NEEDED
Status: DISCONTINUED | OUTPATIENT
Start: 2021-04-10 | End: 2021-04-10

## 2021-04-10 RX ORDER — FAMOTIDINE 10 MG/ML
20 INJECTION, SOLUTION INTRAVENOUS 2 TIMES DAILY PRN
Status: DISCONTINUED | OUTPATIENT
Start: 2021-04-10 | End: 2021-04-10

## 2021-04-10 RX ORDER — CARBOPROST TROMETHAMINE 250 UG/ML
250 INJECTION, SOLUTION INTRAMUSCULAR AS NEEDED
Status: DISCONTINUED | OUTPATIENT
Start: 2021-04-10 | End: 2021-04-10

## 2021-04-10 RX ORDER — BUTORPHANOL TARTRATE 1 MG/ML
1 INJECTION, SOLUTION INTRAMUSCULAR; INTRAVENOUS ONCE
Status: COMPLETED | OUTPATIENT
Start: 2021-04-10 | End: 2021-04-10

## 2021-04-10 RX ORDER — OXYTOCIN-SODIUM CHLORIDE 0.9% IV SOLN 30 UNIT/500ML 30-0.9/5 UT/ML-%
SOLUTION INTRAVENOUS
Status: COMPLETED
Start: 2021-04-10 | End: 2021-04-10

## 2021-04-10 RX ORDER — ONDANSETRON 2 MG/ML
4 INJECTION INTRAMUSCULAR; INTRAVENOUS EVERY 6 HOURS PRN
Status: DISCONTINUED | OUTPATIENT
Start: 2021-04-10 | End: 2021-04-12 | Stop reason: HOSPADM

## 2021-04-10 RX ORDER — LANOLIN
CREAM (ML) TOPICAL
Status: DISCONTINUED | OUTPATIENT
Start: 2021-04-10 | End: 2021-04-12 | Stop reason: HOSPADM

## 2021-04-10 RX ORDER — ONDANSETRON 4 MG/1
4 TABLET, FILM COATED ORAL EVERY 6 HOURS PRN
Status: DISCONTINUED | OUTPATIENT
Start: 2021-04-10 | End: 2021-04-10

## 2021-04-10 RX ADMIN — BENZOCAINE 1 APPLICATION: 5.6 OINTMENT TOPICAL at 16:48

## 2021-04-10 RX ADMIN — IBUPROFEN 600 MG: 600 TABLET, FILM COATED ORAL at 13:09

## 2021-04-10 RX ADMIN — OXYTOCIN-SODIUM CHLORIDE 0.9% IV SOLN 30 UNIT/500ML 999 ML/HR: 30-0.9/5 SOLUTION at 11:30

## 2021-04-10 RX ADMIN — Medication: at 15:02

## 2021-04-10 RX ADMIN — Medication: at 16:46

## 2021-04-10 RX ADMIN — PRAMOXINE HYDROCHLORIDE HYDROCORTISONE ACETATE: 100; 100 AEROSOL, FOAM TOPICAL at 16:46

## 2021-04-10 RX ADMIN — METHYLERGONOVINE MALEATE 200 MCG: 0.2 INJECTION INTRAVENOUS at 11:36

## 2021-04-10 RX ADMIN — DOCUSATE SODIUM 100 MG: 100 CAPSULE, LIQUID FILLED ORAL at 20:12

## 2021-04-10 RX ADMIN — IBUPROFEN 600 MG: 600 TABLET, FILM COATED ORAL at 20:12

## 2021-04-10 RX ADMIN — OXYTOCIN 999 ML/HR: 10 INJECTION INTRAVENOUS at 11:30

## 2021-04-10 RX ADMIN — BUTORPHANOL TARTRATE 1 MG: 1 INJECTION, SOLUTION INTRAMUSCULAR; INTRAVENOUS at 11:40

## 2021-04-10 RX ADMIN — OXYTOCIN 125 ML/HR: 10 INJECTION INTRAVENOUS at 13:39

## 2021-04-10 NOTE — L&D DELIVERY NOTE
[facility]  Vaginal Delivery Note  Adrienne Lezama  1985    Delivery     Delivery: Vaginal, Spontaneous     YOB: 2021    Time of Birth:  Gestational Age 11:26 AM   39w1d     Anesthesia: None     Delivering clinician:     Forceps?   No   Vacuum? No    Shoulder dystocia present: No        Delivery narrative:  Patient presented with regular uterine ctx and progressed on her own to complete with the desire to push. She was set up and pushed approximatetly 5 minutes to deliver a LVMI in direct occiput anterior position that restituted to maternal right. Anterior and posterior shoulder delivered without difficulty.  Nuchal cord was  reduced after delivery. Infant was bulb suctioned and after delayed cord clamping, cord was cut and infant placed on mother's chest.  Placenta delivered SI/3VC and IV pitocin, IM methergine x 1 and fundal massage were used for hemostasis.          Infant    Findings: male  infant     Infant observations: Weight: No birth weight on file.   Length:   in  Observations/Comments:  scale 4      Apgars: 8  @ 1 minute /    9  @ 5 minutes   Infant Name: Peter     Placenta, Cord, and Fluid    Placenta delivered  Spontaneous  at   4/10/2021 11:30 AM     Cord: 3 vessels  present.   Nuchal Cord?  yes; Number of nuchal loops present:  1    Cord blood obtained: Yes    Cord gases obtained:  No    Cord gas results: Venous:  No results found for: PHCVEN    Arterial:  No results found for: PHCART     Repair    Episiotomy: Not recorded     No    Lacerations: No   Estimated Blood Loss:             Complications  Nuchal x 1    Disposition  Mother to Remain in LD  in stable condition currently.  Baby to remains with mom  in stable condition currently.      Lynn Sevilla MD  04/10/21  11:50 EDT

## 2021-04-10 NOTE — LACTATION NOTE
This note was copied from a baby's chart.  Mother called for assist, having trouble rousing infant for feeding. Assisted with rousing infant, upon oral assessment infant does have lingual frenulum to the tip, can extend tongue past gum line but does not stick tongue out of mouth. No snap back with finger suck. Limited lateralization of the tongue. Infant then readily latches on, attempted in cross cradle and football holds several times, mother reports more comfort in football hold but there is a pinchy sensation that is tolerable. No nipple damage upon release, some lipstick shaping of nipple. Mother reports that her last child had a tongue tie and she also had this shaping of her nipple. Discussed deep latch technique and ways to achieve it. Discussed attempting different positions for comfort and rotating positions to prevent damage to the nipple. Encouraged mother to call as needed.

## 2021-04-10 NOTE — LACTATION NOTE
This note was copied from a baby's chart.  P4T. Mother reports that skin to skin was a bit interrupted during recovery, but that infant did latch. She denies any pain or tenderness with latch, denies any questions or concerns at this time. Discussed feeding every 2-3 hours and PRN 15 min per side, how to know baby is getting enough, and when to expect milk to come in. Mother reports that she does not use a pump, encouraged mother to contact lactation if she changed her mind and did want assistance in obtaining hand or electric pump. Encouraged mother to call as needed for assist.

## 2021-04-10 NOTE — PLAN OF CARE
Problem: Adult Inpatient Plan of Care  Goal: Plan of Care Review  Outcome: Ongoing, Progressing  Flowsheets  Taken 4/10/2021 1731 by Ese Webster RN  Plan of Care Reviewed With:   patient   spouse  Outcome Summary: Vital signs stable. Pain well controlled with motrin. Breastfeeding well. Up x2 voiding. Will continue to monitor.  Taken 4/10/2021 1312 by Deanne Frazier RN  Progress: improving  Goal: Patient-Specific Goal (Individualized)  Outcome: Ongoing, Progressing  Goal: Absence of Hospital-Acquired Illness or Injury  Outcome: Ongoing, Progressing  Intervention: Identify and Manage Fall Risk  Recent Flowsheet Documentation  Taken 4/10/2021 1530 by Ese Webster RN  Safety Promotion/Fall Prevention: safety round/check completed  Taken 4/10/2021 1430 by Ese Webster RN  Safety Promotion/Fall Prevention: safety round/check completed  Taken 4/10/2021 1400 by Ese Webster RN  Safety Promotion/Fall Prevention: safety round/check completed  Goal: Optimal Comfort and Wellbeing  Outcome: Ongoing, Progressing  Goal: Readiness for Transition of Care  Outcome: Ongoing, Progressing     Problem: Adjustment to Role Transition (Postpartum Vaginal Delivery)  Goal: Successful Maternal Role Transition  Outcome: Ongoing, Progressing     Problem: Bleeding (Postpartum Vaginal Delivery)  Goal: Hemostasis  Outcome: Ongoing, Progressing     Problem: Infection (Postpartum Vaginal Delivery)  Goal: Absence of Infection Signs and Symptoms  Outcome: Ongoing, Progressing  Intervention: Prevent or Manage Infection  Recent Flowsheet Documentation  Taken 4/10/2021 1530 by Ese Webster RN  Perineal Care: absorbent pad changed  Taken 4/10/2021 1500 by Ese Webster RN  Perineal Care:   absorbent pad changed   absorbent brief changed   perineum cleansed     Problem: Pain (Postpartum Vaginal Delivery)  Goal: Acceptable Pain Control  Outcome: Ongoing, Progressing     Problem: Urinary Retention (Postpartum Vaginal Delivery)  Goal:  Effective Urinary Elimination  Outcome: Ongoing, Progressing   Goal Outcome Evaluation:  Plan of Care Reviewed With: patient, spouse     Outcome Summary: Vital signs stable. Pain well controlled with motrin. Breastfeeding well. Up x2 voiding. Will continue to monitor.

## 2021-04-10 NOTE — H&P
[facility]  Obstetric History and Physical    Chief Complaint   Patient presents with   • Laboring     Patient arrived to L&D laboring. Patient complains of contractions every 2-5 min. +FM. Denies LOF/VB.       Subjective     Patient is a 35 y.o. female  currently at 39w1d, who presents with regular uterine contractions that started earlier this morning. Denies loss of fluid or vag bleeding. +FM     Her prenatal care is benign.  Her previous obstetric/gynecological history is noted for is non-contributory.    The following portions of the patients history were reviewed and updated as appropriate: current medications, allergies, past medical history, past surgical history, past family history, past social history and problem list .       Prenatal Information:  Prenatal Results     POC Urine Glucose/Protein     Test Value Reference Range Date Time    Urine Glucose  Negative mg/dL Negative, 1000 mg/dL (3+) 21 1201    Urine Protein  Negative mg/dL Negative 21 1201          Initial Prenatal Labs     Test Value Reference Range Date Time    Hemoglobin  12.2 g/dL 11.1 - 15.9 20 1215    Hematocrit  36.2 % 34.0 - 46.6 20 1215    Platelets  181 10*3/mm3 140 - 450 04/10/21 1028       181 10*3/mm3 140 - 450 21 1026       202 x10E3/uL 150 - 450 20 1215    Rubella IgG  2.70 index Immune >0.99 20 1215    Hepatitis B SAg  Negative  Negative 20 1215    Hepatitis C Ab  <0.1 s/co ratio 0.0 - 0.9 20 1215    RPR  Non Reactive  Non Reactive 20 1215    ABO  O   20 1215    Rh  Positive   20 1215    Antibody Screen  Negative  Negative 20 1215    HIV  Non Reactive  Non Reactive 20 1215    Urine Culture  Final report   20 1132    Gonorrhea        Chlamydia        TSH              2nd and 3rd Trimester     Test Value Reference Range Date Time    Hemoglobin (repeated)  10.6 g/dL 12.0 - 15.9 04/10/21 1028       10.1 g/dL 12.0 - 15.9 21 1026     Hematocrit (repeated)  32.8 % 34.0 - 46.6 04/10/21 1028       30.2 % 34.0 - 46.6 01/05/21 1026    GCT  86 mg/dL 65 - 139 01/05/21 1026    Antibody Screen (repeated)        GTT Fasting        GTT 1 Hr        GTT 2 Hr        GTT 3 Hr        Group B Strep  Negative  Negative 03/17/21 1232          Drug Screening     Test Value Reference Range Date Time    Amphetamine Screen        Barbiturate Screen        Benzodiazepine Screen        Methadone Screen        Phencyclidine Screen        Opiates Screen        THC Screen        Cocaine Screen        Propoxyphene Screen        Buprenorphine Screen        Methamphetamine Screen        Oxycodone Screen        Tricyclic Antidepressants Screen              Other (Risk screening)     Test Value Reference Range Date Time    Varicella IgG        Parvovirus IgG        CMV IgG        Cystic Fibrosis        Hemoglobin electrophoresis        NIPT        MSAFP-4        AFP (for NTD only)              Legend    ^: Historical                      External Prenatal Results     Pregnancy Outside Results - Transcribed From Office Records - See Scanned Records For Details     Test Value Date Time    Hgb  10.6 g/dL 04/10/21 1028       10.1 g/dL 01/05/21 1026       12.2 g/dL 09/29/20 1215    Hct  32.8 % 04/10/21 1028       30.2 % 01/05/21 1026       36.2 % 09/29/20 1215    ABO  O  09/29/20 1215    Rh  Positive  09/29/20 1215    Antibody Screen  Negative  09/29/20 1215    Glucose Fasting GTT       Glucose Tolerance Test 1 hour       Glucose Tolerance Test 3 hour       Gonorrhea (discrete)       Chlamydia (discrete)       RPR  Non Reactive  09/29/20 1215    VDRL       Syphilis Antibody       Rubella  2.70 index 09/29/20 1215    HBsAg  Negative  09/29/20 1215    Herpes Simplex Virus PCR       Herpes Simplex VIrus Culture       HIV  Non Reactive  09/29/20 1215    Hep C RNA Quant PCR       Hep C Antibody  <0.1 s/co ratio 09/29/20 1215    AFP       Group B Strep  Negative  03/17/21 1232    GBS  Susceptibility to Clindamycin       GBS Susceptibility to Erythromycin       Fetal Fibronectin       Genetic Testing, Maternal Blood             Drug Screening     Test Value Date Time    Urine Drug Screen       Amphetamine Screen       Barbiturate Screen       Benzodiazepine Screen       Methadone Screen       Phencyclidine Screen       Opiates Screen       THC Screen       Cocaine Screen       Propoxyphene Screen       Buprenorphine Screen       Methamphetamine Screen       Oxycodone Screen       Tricyclic Antidepressants Screen             Legend    ^: Historical                         Past OB History:     OB History    Para Term  AB Living   4 3 3 0 0 3   SAB TAB Ectopic Molar Multiple Live Births   0 0 0 0 0 3      # Outcome Date GA Lbr Woody/2nd Weight Sex Delivery Anes PTL Lv   4 Current            3 Term    3232 g (7 lb 2 oz) F Vaginal unsp   MAHI   2 Term    3714 g (8 lb 3 oz) M Vaginal unsp   MAHI   1 Term    3487 g (7 lb 11 oz) F Vaginal unsp   MAHI      Obstetric Comments   BOBBI NUNES, AND VENKATA        Past Medical History: History reviewed. No pertinent past medical history.   Past Surgical History Past Surgical History:   Procedure Laterality Date   • HEMORRHOIDECTOMY     • WISDOM TOOTH EXTRACTION        Family History: Family History   Problem Relation Age of Onset   • Colon cancer Paternal Grandmother    • No Known Problems Daughter    • No Known Problems Daughter    • No Known Problems Son       Social History:  reports that she has never smoked. She has never used smokeless tobacco.   reports previous alcohol use.   reports no history of drug use.        Review of Systems   Constitutional: Negative for chills, fatigue and fever.   Gastrointestinal: Positive for abdominal pain.   Genitourinary: Positive for pelvic pain. Negative for vaginal bleeding, vaginal discharge and vaginal pain.   All other systems reviewed and are negative.        Objective     Vital Signs Range for  the last 24 hours  Temperature: Temp:  [98.9 °F (37.2 °C)] 98.9 °F (37.2 °C)   Temp Source: Temp src: Oral   BP: BP: (119)/(76) 119/76   Pulse: Heart Rate:  [97] 97   Respirations:     SPO2: SpO2:  [98 %] 98 %   O2 Amount (l/min):     O2 Devices     Weight: Weight:  [83 kg (183 lb)] 83 kg (183 lb)     Physical Examination: General appearance - alert, well appearing, and in no distress  Abdomen - gravid, soft, nontender, nondistended, no masses or organomegaly  Musculoskeletal - no joint tenderness, deformity or swelling  Extremities - peripheral pulses normal, no pedal edema, no clubbing or cyanosis  Skin - normal coloration and turgor, no rashes, no suspicious skin lesions noted    Presentation: Vertex    Cervix: Exam by:     Dilation: Cervical Dilation (cm): 9   Effacement: Cervical Effacement: 100%   Station:       Fetal Heart Rate Assessment   Method:     Beats/min:     Baseline:     Variability:     Accels:     Decels:     Tracing Category:       Uterine Assessment   Method:     Frequency (min):     Ctx Count in 10 min:     Duration:     Intensity:     Intensity by IUPC:     Resting Tone:     Resting Tone by IUPC:     Hodge Units:       Laboratory Results:   Results from last 7 days   Lab Units 04/10/21  1028   WBC 10*3/mm3 7.99   HEMOGLOBIN g/dL 10.6*   HEMATOCRIT % 32.8*   PLATELETS 10*3/mm3 181         Assessment/Plan       Grade II hemorrhoids    Multigravida of advanced maternal age in third trimester    Maternal anemia in pregnancy, antepartum    Pregnancy      Assessment & Plan    Assessment:  1.  Intrauterine pregnancy at 39w1d gestation with reactive, reassuring fetal status.    2.  labor  without ROM  3.  Obstetrical history significant for is non-contributory.  4.  GBS status:   Strep Gp B DC   Date Value Ref Range Status   03/17/2021 Negative Negative Final     Comment:     Centers for Disease Control and Prevention (CDC) and American Congress  of Obstetricians and Gynecologists (ACOG)  guidelines for prevention of   group B streptococcal (GBS) disease specify co-collection of  a vaginal and rectal swab specimen to maximize sensitivity of GBS  detection. Per the CDC and ACOG, swabbing both the lower vagina and  rectum substantially increases the yield of detection compared with  sampling the vagina alone.  Penicillin G, ampicillin, or cefazolin are indicated for intrapartum  prophylaxis of  GBS colonization. Reflex susceptibility  testing should be performed prior to use of clindamycin only on GBS  isolates from penicillin-allergic women who are considered a high risk  for anaphylaxis. Treatment with vancomycin without additional testing  is warranted if resistance to clindamycin is noted.         Plan:  1. fetal and uterine monitoring  continuously and expectant management  2. Plan of care has been reviewed with patient and   3.  Risks, benefits of treatment plan have been discussed.  4.  All questions have been answered.      Lynn Sevilla MD  4/10/2021  11:44 EDT

## 2021-04-10 NOTE — PLAN OF CARE
Problem: Adult Inpatient Plan of Care  Goal: Plan of Care Review  Outcome: Ongoing, Progressing  Flowsheets (Taken 4/10/2021 1312)  Progress: improving  Plan of Care Reviewed With:   patient   spouse  Outcome Summary: Patient delivered vaginally at 1126. Mom and baby resting.  Goal: Patient-Specific Goal (Individualized)  Outcome: Ongoing, Progressing  Flowsheets (Taken 4/10/2021 1312)  Patient-Specific Goals (Include Timeframe): NCB  Individualized Care Needs: NCB  Anxieties, Fears or Concerns: none  Goal: Absence of Hospital-Acquired Illness or Injury  Outcome: Ongoing, Progressing  Intervention: Identify and Manage Fall Risk  Recent Flowsheet Documentation  Taken 4/10/2021 1215 by Deanne Frazier RN  Safety Promotion/Fall Prevention: safety round/check completed  Taken 4/10/2021 1040 by Deanne Frazier RN  Safety Promotion/Fall Prevention: safety round/check completed  Goal: Optimal Comfort and Wellbeing  Outcome: Ongoing, Progressing  Intervention: Provide Person-Centered Care  Recent Flowsheet Documentation  Taken 4/10/2021 1215 by Deanne Frazier RN  Trust Relationship/Rapport:   care explained   choices provided   questions answered   questions encouraged  Taken 4/10/2021 1040 by Deanne Frazier RN  Trust Relationship/Rapport:   questions encouraged   questions answered   care explained   choices provided  Goal: Readiness for Transition of Care  Outcome: Ongoing, Progressing  Intervention: Mutually Develop Transition Plan  Recent Flowsheet Documentation  Taken 4/10/2021 1049 by Deanne Frazier RN  Equipment Currently Used at Home: none  Taken 4/10/2021 1045 by Deanne Frazier RN  Transportation Anticipated:   car, drives self   family or friend will provide  Patient/Family Anticipated Services at Transition: none  Patient/Family Anticipates Transition to: home with family     Problem: Adjustment to Role Transition (Postpartum Vaginal Delivery)  Goal: Successful Maternal Role Transition  Outcome: Ongoing, Progressing      Problem: Bleeding (Postpartum Vaginal Delivery)  Goal: Hemostasis  Outcome: Ongoing, Progressing     Problem: Infection (Postpartum Vaginal Delivery)  Goal: Absence of Infection Signs and Symptoms  Outcome: Ongoing, Progressing     Problem: Pain (Postpartum Vaginal Delivery)  Goal: Acceptable Pain Control  Outcome: Ongoing, Progressing     Problem: Urinary Retention (Postpartum Vaginal Delivery)  Goal: Effective Urinary Elimination  Outcome: Ongoing, Progressing   Goal Outcome Evaluation:  Plan of Care Reviewed With: patient, spouse  Progress: improving  Outcome Summary: Patient delivered vaginally at 1126. Mom and baby resting.

## 2021-04-11 LAB
BASOPHILS # BLD AUTO: 0.02 10*3/MM3 (ref 0–0.2)
BASOPHILS NFR BLD AUTO: 0.3 % (ref 0–1.5)
DEPRECATED RDW RBC AUTO: 43.5 FL (ref 37–54)
EOSINOPHIL # BLD AUTO: 0.08 10*3/MM3 (ref 0–0.4)
EOSINOPHIL NFR BLD AUTO: 1.1 % (ref 0.3–6.2)
ERYTHROCYTE [DISTWIDTH] IN BLOOD BY AUTOMATED COUNT: 14.5 % (ref 12.3–15.4)
HCT VFR BLD AUTO: 28.7 % (ref 34–46.6)
HGB BLD-MCNC: 9.2 G/DL (ref 12–15.9)
IMM GRANULOCYTES # BLD AUTO: 0.03 10*3/MM3 (ref 0–0.05)
IMM GRANULOCYTES NFR BLD AUTO: 0.4 % (ref 0–0.5)
LYMPHOCYTES # BLD AUTO: 1.39 10*3/MM3 (ref 0.7–3.1)
LYMPHOCYTES NFR BLD AUTO: 18.3 % (ref 19.6–45.3)
MCH RBC QN AUTO: 26.5 PG (ref 26.6–33)
MCHC RBC AUTO-ENTMCNC: 32.1 G/DL (ref 31.5–35.7)
MCV RBC AUTO: 82.7 FL (ref 79–97)
MONOCYTES # BLD AUTO: 0.45 10*3/MM3 (ref 0.1–0.9)
MONOCYTES NFR BLD AUTO: 5.9 % (ref 5–12)
NEUTROPHILS NFR BLD AUTO: 5.62 10*3/MM3 (ref 1.7–7)
NEUTROPHILS NFR BLD AUTO: 74 % (ref 42.7–76)
NRBC BLD AUTO-RTO: 0 /100 WBC (ref 0–0.2)
PLATELET # BLD AUTO: 172 10*3/MM3 (ref 140–450)
PMV BLD AUTO: 10.6 FL (ref 6–12)
RBC # BLD AUTO: 3.47 10*6/MM3 (ref 3.77–5.28)
WBC # BLD AUTO: 7.59 10*3/MM3 (ref 3.4–10.8)

## 2021-04-11 PROCEDURE — 85025 COMPLETE CBC W/AUTO DIFF WBC: CPT | Performed by: OBSTETRICS & GYNECOLOGY

## 2021-04-11 RX ADMIN — OXYCODONE HYDROCHLORIDE AND ACETAMINOPHEN 1 TABLET: 5; 325 TABLET ORAL at 01:00

## 2021-04-11 RX ADMIN — IBUPROFEN 600 MG: 600 TABLET, FILM COATED ORAL at 03:08

## 2021-04-11 RX ADMIN — DOCUSATE SODIUM 100 MG: 100 CAPSULE, LIQUID FILLED ORAL at 21:15

## 2021-04-11 RX ADMIN — IBUPROFEN 600 MG: 600 TABLET, FILM COATED ORAL at 17:33

## 2021-04-11 RX ADMIN — DOCUSATE SODIUM 100 MG: 100 CAPSULE, LIQUID FILLED ORAL at 09:12

## 2021-04-11 RX ADMIN — IBUPROFEN 600 MG: 600 TABLET, FILM COATED ORAL at 09:12

## 2021-04-11 NOTE — LACTATION NOTE
This note was copied from a baby's chart.  Infant very sleepy, mother having trouble rousing for feed. Assisted in rousing infant, gave a few drops of EBM via spoon feed, infant then showing feeding cues. Mother then able to latch infant easily, assisted with chin tug for comfort. Discussed sleepiness after bath/circ, potential for clusterfeeding tonight. Encouraged mother to call as needed.

## 2021-04-11 NOTE — PLAN OF CARE
Problem: Adult Inpatient Plan of Care  Goal: Plan of Care Review  Outcome: Ongoing, Progressing  Flowsheets  Taken 4/11/2021 1925 by Sharona Aranda RN  Progress: improving  Outcome Summary: pt vss, resting well. breastfeeding well. adequate outptut. plans to discharge tomorrow.  Taken 4/10/2021 1731 by Ese Webster RN  Plan of Care Reviewed With:   patient   spouse  Goal: Patient-Specific Goal (Individualized)  Outcome: Ongoing, Progressing  Goal: Absence of Hospital-Acquired Illness or Injury  Outcome: Ongoing, Progressing  Intervention: Identify and Manage Fall Risk  Recent Flowsheet Documentation  Taken 4/11/2021 1857 by Sharona Aranda RN  Safety Promotion/Fall Prevention: safety round/check completed  Taken 4/11/2021 1733 by Sharona Aranda RN  Safety Promotion/Fall Prevention: safety round/check completed  Taken 4/11/2021 1447 by Sharona Aranda RN  Safety Promotion/Fall Prevention: safety round/check completed  Taken 4/11/2021 1205 by Sharona Aranda RN  Safety Promotion/Fall Prevention: safety round/check completed  Taken 4/11/2021 0912 by Sharona Aranda RN  Safety Promotion/Fall Prevention: safety round/check completed  Taken 4/11/2021 0745 by Sharona Aranda RN  Safety Promotion/Fall Prevention: safety round/check completed  Goal: Optimal Comfort and Wellbeing  Outcome: Ongoing, Progressing  Goal: Readiness for Transition of Care  Outcome: Ongoing, Progressing     Problem: Adjustment to Role Transition (Postpartum Vaginal Delivery)  Goal: Successful Maternal Role Transition  Outcome: Ongoing, Progressing     Problem: Bleeding (Postpartum Vaginal Delivery)  Goal: Hemostasis  Outcome: Ongoing, Progressing     Problem: Infection (Postpartum Vaginal Delivery)  Goal: Absence of Infection Signs and Symptoms  Outcome: Ongoing, Progressing     Problem: Pain (Postpartum Vaginal Delivery)  Goal: Acceptable Pain Control  Outcome: Ongoing, Progressing  Intervention: Prevent or Manage  Pain  Recent Flowsheet Documentation  Taken 4/11/2021 0912 by Sharona Aranda, RN  Pain Management Interventions:   see MAR   quiet environment facilitated   pain management plan reviewed with patient/caregiver     Problem: Urinary Retention (Postpartum Vaginal Delivery)  Goal: Effective Urinary Elimination  Outcome: Ongoing, Progressing   Goal Outcome Evaluation:     Progress: improving  Outcome Summary: pt vss, resting well. breastfeeding well. adequate outptut. plans to discharge tomorrow.

## 2021-04-11 NOTE — LACTATION NOTE
This note was copied from a baby's chart.  Mother called for latch help, when moving infant he immediately had large emesis, then showing no interest in feeding. Advised mother to place skin to skin and call once infant showing feeding cues.

## 2021-04-11 NOTE — LACTATION NOTE
This note was copied from a baby's chart.  Mother called for latch assist. Mother able to latch infant on both sides in cross cradle, does take several tries to achieve deep latch. Infant does have tight mouth, able to get more comfortable latch with the assist of chin tug. Demonstrated for mother and father how to do chin tug, encouraged FOB to assist. Even with deep latch mother still feeling some pinchiness, she reports it is tolerable, still some mild lipstick shaping to nipple, no damage noted. Avised mother to discuss potential release with ped tomorrow if it is causing feeding issues. Advised to call for assist as needed.

## 2021-04-11 NOTE — PROGRESS NOTES
"[facility]  Vaginal Delivery Progress Note    Subjective   Postpartum Day 1: Vaginal Delivery    The patient feels well.  Her pain is well controlled with prescribed pain medications.   She is ambulating well.  Patient describes her bleeding as thin lochia.    Breastfeeding: infant latching.    Objective     Vital Signs Range for the last 24 hours  Temperature: Temp:  [97.3 °F (36.3 °C)-98.3 °F (36.8 °C)] 98.1 °F (36.7 °C)   Temp Source: Temp src: Oral   BP: BP: ()/(48-73) 107/68   Pulse: Heart Rate:  [62-93] 73   Respirations: Resp:  [16] 16   SPO2:     O2 Amount (l/min):     O2 Devices     Weight:       Admit Height:  Height: 167.6 cm (66\")      Physical Exam:  General:  no acute distress.  Abdomen: abdomen is soft without significant tenderness, masses, organomegaly or guarding. Fundus: appropriate, firm, non tender  Extremities: normal, atraumatic, no cyanosis, and trace edema.        Lab results reviewed:  Yes   Rubella:  No results found for: RUBELLAIGGIN Nurse Transcribed from prenatal record --    Rubella Antibodies, IgG   Date Value Ref Range Status   09/29/2020 2.70 Immune >0.99 index Final     Comment:                                     Non-immune       <0.90                                  Equivocal  0.90 - 0.99                                  Immune           >0.99       Rh Status:    RH type   Date Value Ref Range Status   04/10/2021 Positive  Final     Rh Factor   Date Value Ref Range Status   09/29/2020 Positive  Final     Comment:     Please note: Prior records for this patient's ABO / Rh type are not  available for additional verification.       Immunizations:   Immunization History   Administered Date(s) Administered   • Flulaval/Fluarix/Fluzone Quad 10/28/2020   • Tdap 02/03/2021       Assessment/Plan       Grade II hemorrhoids    Multigravida of advanced maternal age in third trimester    Maternal anemia in pregnancy, antepartum    Pregnancy      Adrienne Lezama is Day 1  " post-partum  Vaginal, Spontaneous   .      Plan:  Continue current care.   Desires elective circumcision today    Kira Rodriguez MD  4/11/2021  11:08 EDT

## 2021-04-12 VITALS
WEIGHT: 183 LBS | HEIGHT: 66 IN | HEART RATE: 69 BPM | OXYGEN SATURATION: 98 % | RESPIRATION RATE: 18 BRPM | TEMPERATURE: 98 F | DIASTOLIC BLOOD PRESSURE: 66 MMHG | SYSTOLIC BLOOD PRESSURE: 102 MMHG | BODY MASS INDEX: 29.41 KG/M2

## 2021-04-12 PROCEDURE — 99238 HOSP IP/OBS DSCHRG MGMT 30/<: CPT | Performed by: OBSTETRICS & GYNECOLOGY

## 2021-04-12 RX ORDER — IBUPROFEN 800 MG/1
800 TABLET ORAL EVERY 8 HOURS PRN
Qty: 40 TABLET | Refills: 2 | Status: SHIPPED | OUTPATIENT
Start: 2021-04-12 | End: 2022-05-11

## 2021-04-12 RX ADMIN — IBUPROFEN 600 MG: 600 TABLET, FILM COATED ORAL at 00:37

## 2021-04-12 RX ADMIN — DOCUSATE SODIUM 100 MG: 100 CAPSULE, LIQUID FILLED ORAL at 08:00

## 2021-04-12 RX ADMIN — IBUPROFEN 600 MG: 600 TABLET, FILM COATED ORAL at 06:11

## 2021-04-12 NOTE — DISCHARGE SUMMARY
Vaginal delivery Discharge Summary      Date of Admission: 4/10/2021    Date of Discharge:  2021    Patient: Adrienne Lezama      MR#:[id number]    Surgeon/OB: Lynn Grace Sevilla     Discharge Diagnosis: Vaginal Delivery at 39w1d, uncomplicated recovery    Procedures:  Vaginal, Spontaneous     4/10/2021    11:26 AM      Anesthesia:  None     Presenting Problem/History of Present Illness  Pregnancy [Z34.90]  Vaginal delivery [O80]     Patient Active Problem List   Diagnosis   • History of 2019 novel coronavirus disease (COVID-19)   • Gastrointestinal hemorrhage associated with anorectal source   • Grade II hemorrhoids   • Multigravida of advanced maternal age in third trimester   • Maternal anemia in pregnancy, antepartum   • Pregnancy   •  (normal spontaneous vaginal delivery)       Hospital Course  Patient is a 35 y.o. female  at 39w1d status post vaginal delivery.    Uneventful recovery.  Patient is ambulating, tolerating a regular diet.  Perineum is intact.    Infant:   male  fetus 3635 g (8 lb 0.2 oz)  with Apgar scores of 8 , 9  at five minutes.    Condition on Discharge:  Stable    Vital Signs  Temp:  [98 °F (36.7 °C)-98.3 °F (36.8 °C)] 98 °F (36.7 °C)  Heart Rate:  [69-93] 69  Resp:  [16-18] 18  BP: (102-107)/(66-73) 102/66    Lab Results   Component Value Date    WBC 7.59 2021    HGB 9.2 (L) 2021    HCT 28.7 (L) 2021    MCV 82.7 2021     2021       Discharge Disposition  Home or Self Care    Discharge Medications     Discharge Medications      New Medications      Instructions Start Date   ibuprofen 800 MG tablet  Commonly known as: ADVIL,MOTRIN   800 mg, Oral, Every 8 Hours PRN         Continue These Medications      Instructions Start Date   calcium carbonate 500 MG chewable tablet  Commonly known as: TUMS   1 tablet, Oral, As Needed      ferrous gluconate 324 MG tablet  Commonly known as: FERGON   324 mg, Oral, Daily With Breakfast      MIRALAX PO    Oral, As Needed      prenatal (CLASSIC) vitamin  tablet  Generic drug: prenatal vitamin   Oral, Daily             Discharge Diet: Regular    Activity at Discharge:     Follow-up Appointments  Future Appointments   Date Time Provider Department Center   4/13/2021  1:00 PM Basim Mead MD [facility]   4/13/2021  1:00 PM [facility]   5/18/2021  9:30 AM Kar Tirado MD [facility] None     Additional Instructions for the Follow-ups that You Need to Schedule     Call MD for problems / concerns.   As directed      Heavy bleeding:  Greater than a pad an hour for more than 2 hours  Fever greater than 101.3   Redness of the episiotomy or vaginal laceration and/or severe pain increased from discharge pain.      Order Comments: Heavy bleeding:  Greater than a pad an hour for more than 2 hours Fever greater than 101.3 Redness of the episiotomy or vaginal laceration and/or severe pain increased from discharge pain.                   Prenatal labs/vax:   Immunization History   Administered Date(s) Administered   • Flulaval/Fluarix/Fluzone Quad 10/28/2020   • Tdap 02/03/2021         External Prenatal Results     Pregnancy Outside Results - Transcribed From Office Records - See Scanned Records For Details     Test Value Date Time    Hgb  9.2 g/dL 04/11/21 0717       10.6 g/dL 04/10/21 1028       10.1 g/dL 01/05/21 1026       12.2 g/dL 09/29/20 1215    Hct  28.7 % 04/11/21 0717       32.8 % 04/10/21 1028       30.2 % 01/05/21 1026       36.2 % 09/29/20 1215    ABO  O  04/10/21 1108    Rh  Positive  04/10/21 1108    Antibody Screen  Negative  04/10/21 1108       Negative  09/29/20 1215    Glucose Fasting GTT       Glucose Tolerance Test 1 hour       Glucose Tolerance Test 3 hour       Gonorrhea (discrete)       Chlamydia (discrete)       RPR  Non Reactive  09/29/20 1215    VDRL       Syphilis Antibody       Rubella  2.70 index 09/29/20 1215    HBsAg  Negative  09/29/20 1215    Herpes Simplex Virus  PCR       Herpes Simplex VIrus Culture       HIV  Non Reactive  09/29/20 1215    Hep C RNA Quant PCR       Hep C Antibody  <0.1 s/co ratio 09/29/20 1215    AFP       Group B Strep  Negative  03/17/21 1232    GBS Susceptibility to Clindamycin       GBS Susceptibility to Erythromycin       Fetal Fibronectin       Genetic Testing, Maternal Blood             Drug Screening     Test Value Date Time    Urine Drug Screen       Amphetamine Screen       Barbiturate Screen       Benzodiazepine Screen       Methadone Screen       Phencyclidine Screen       Opiates Screen       THC Screen       Cocaine Screen       Propoxyphene Screen       Buprenorphine Screen       Methamphetamine Screen       Oxycodone Screen       Tricyclic Antidepressants Screen             Legend    ^: Historical                          Alberto Rondon MD  04/12/21  14:36 EDT

## 2021-04-12 NOTE — PAYOR COMM NOTE
"[facility]  [address] [place] [address]  Facility NPI: [id number]  Fatuma Griffin  Fax: [phone]  Phone: [phone] (Niru: 8584, Elizabeth: 4580)  Email: [email or web address]    Date: 2021  To: [employer]   Fax: [phone]  Subject: DELIVERY NOTE   Reference #: [id number]    Please don't hesitate to contact me with any questions or concerns.    Adrienne Mg (35 y.o. Female)     Date of Birth Social Security Number Address Home Phone MRN    1985  [address] [id number] [phone] [id number]    [employer] Marital Status          None Single       Admission Date Admission Type Admitting Provider Attending Provider Department, Room/Bed    4/10/21 Elective Lynn Sevilla MD  [facility], [address]/    Discharge Date Discharge Disposition Discharge Destination        2021 Home or Self Care              Attending Provider: (none)   Allergies: No Known Allergies    Isolation: None   Infection: None   Code Status: Prior    Ht: 167.6 cm (66\")   Wt: 83 kg (183 lb)    Admission Cmt: None   Principal Problem:  (normal spontaneous vaginal delivery) [O80]                 Active Insurance as of 4/10/2021     Primary Coverage     Payor Plan Insurance Group Employer/Plan Group    [employer] [id number]     Payor Plan Address Payor Plan Phone Number Payor Plan Fax Number Effective Dates    [address] [address] [phone]  2019 - None Entered    [facility] [id number]       Subscriber Name Subscriber Birth Date Member ID       ADRIENNE MG 1985 [id number]                 Emergency Contacts      (Rel.) Home Phone Work Phone Mobile Phone    contact,no (Other) [phone] -- --               History & Physical      Lynn Sevilla MD at 04/10/21 [address] [place] [address]  Obstetric History and Physical    Chief Complaint   Patient presents with   • Laboring     Patient arrived " to L&D laboring. Patient complains of contractions every 2-5 min. +FM. Denies LOF/VB.       Subjective     Patient is a 35 y.o. female  currently at 39w1d, who presents with regular uterine contractions that started earlier this morning. Denies loss of fluid or vag bleeding. +FM     Her prenatal care is benign.  Her previous obstetric/gynecological history is noted for is non-contributory.    The following portions of the patients history were reviewed and updated as appropriate: current medications, allergies, past medical history, past surgical history, past family history, past social history and problem list .       Prenatal Information:  Prenatal Results     POC Urine Glucose/Protein     Test Value Reference Range Date Time    Urine Glucose  Negative mg/dL Negative, 1000 mg/dL (3+) 21 1201    Urine Protein  Negative mg/dL Negative 21 1201          Initial Prenatal Labs     Test Value Reference Range Date Time    Hemoglobin  12.2 g/dL 11.1 - 15.9 20 1215    Hematocrit  36.2 % 34.0 - 46.6 20 1215    Platelets  181 10*3/mm3 140 - 450 04/10/21 1028       181 10*3/mm3 140 - 450 21 1026       202 x10E3/uL 150 - 450 20 1215    Rubella IgG  2.70 index Immune >0.99 20 1215    Hepatitis B SAg  Negative  Negative 20 1215    Hepatitis C Ab  <0.1 s/co ratio 0.0 - 0.9 20 1215    RPR  Non Reactive  Non Reactive 20 1215    ABO  O   20 1215    Rh  Positive   20 1215    Antibody Screen  Negative  Negative 20 1215    HIV  Non Reactive  Non Reactive 20 1215    Urine Culture  Final report   20 1132    Gonorrhea        Chlamydia        TSH              2nd and 3rd Trimester     Test Value Reference Range Date Time    Hemoglobin (repeated)  10.6 g/dL 12.0 - 15.9 04/10/21 1028       10.1 g/dL 12.0 - 15.9 21 1026    Hematocrit (repeated)  32.8 % 34.0 - 46.6 04/10/21 1028       30.2 % 34.0 - 46.6 21 1026    GCT  86 mg/dL 65 - 139  01/05/21 1026    Antibody Screen (repeated)        GTT Fasting        GTT 1 Hr        GTT 2 Hr        GTT 3 Hr        Group B Strep  Negative  Negative 03/17/21 1232          Drug Screening     Test Value Reference Range Date Time    Amphetamine Screen        Barbiturate Screen        Benzodiazepine Screen        Methadone Screen        Phencyclidine Screen        Opiates Screen        THC Screen        Cocaine Screen        Propoxyphene Screen        Buprenorphine Screen        Methamphetamine Screen        Oxycodone Screen        Tricyclic Antidepressants Screen              Other (Risk screening)     Test Value Reference Range Date Time    Varicella IgG        Parvovirus IgG        CMV IgG        Cystic Fibrosis        Hemoglobin electrophoresis        NIPT        MSAFP-4        AFP (for NTD only)              Legend    ^: Historical                      External Prenatal Results     Pregnancy Outside Results - Transcribed From Office Records - See Scanned Records For Details     Test Value Date Time    Hgb  10.6 g/dL 04/10/21 1028       10.1 g/dL 01/05/21 1026       12.2 g/dL 09/29/20 1215    Hct  32.8 % 04/10/21 1028       30.2 % 01/05/21 1026       36.2 % 09/29/20 1215    ABO  O  09/29/20 1215    Rh  Positive  09/29/20 1215    Antibody Screen  Negative  09/29/20 1215    Glucose Fasting GTT       Glucose Tolerance Test 1 hour       Glucose Tolerance Test 3 hour       Gonorrhea (discrete)       Chlamydia (discrete)       RPR  Non Reactive  09/29/20 1215    VDRL       Syphilis Antibody       Rubella  2.70 index 09/29/20 1215    HBsAg  Negative  09/29/20 1215    Herpes Simplex Virus PCR       Herpes Simplex VIrus Culture       HIV  Non Reactive  09/29/20 1215    Hep C RNA Quant PCR       Hep C Antibody  <0.1 s/co ratio 09/29/20 1215    AFP       Group B Strep  Negative  03/17/21 1232    GBS Susceptibility to Clindamycin       GBS Susceptibility to Erythromycin       Fetal Fibronectin       Genetic Testing,  Maternal Blood             Drug Screening     Test Value Date Time    Urine Drug Screen       Amphetamine Screen       Barbiturate Screen       Benzodiazepine Screen       Methadone Screen       Phencyclidine Screen       Opiates Screen       THC Screen       Cocaine Screen       Propoxyphene Screen       Buprenorphine Screen       Methamphetamine Screen       Oxycodone Screen       Tricyclic Antidepressants Screen             Legend    ^: Historical                         Past OB History:     OB History    Para Term  AB Living   4 3 3 0 0 3   SAB TAB Ectopic Molar Multiple Live Births   0 0 0 0 0 3      # Outcome Date GA Lbr Woody/2nd Weight Sex Delivery Anes PTL Lv   4 Current            3 Term    3232 g (7 lb 2 oz) F Vaginal unsp   MAHI   2 Term    3714 g (8 lb 3 oz) M Vaginal unsp   MAHI   1 Term    3487 g (7 lb 11 oz) F Vaginal unsp   MAHI      Obstetric Comments   BOBBI NUNES, AND VENKATA        Past Medical History: History reviewed. No pertinent past medical history.   Past Surgical History Past Surgical History:   Procedure Laterality Date   • HEMORRHOIDECTOMY     • WISDOM TOOTH EXTRACTION        Family History: Family History   Problem Relation Age of Onset   • Colon cancer Paternal Grandmother    • No Known Problems Daughter    • No Known Problems Daughter    • No Known Problems Son       Social History:  reports that she has never smoked. She has never used smokeless tobacco.   reports previous alcohol use.   reports no history of drug use.        Review of Systems   Constitutional: Negative for chills, fatigue and fever.   Gastrointestinal: Positive for abdominal pain.   Genitourinary: Positive for pelvic pain. Negative for vaginal bleeding, vaginal discharge and vaginal pain.   All other systems reviewed and are negative.        Objective     Vital Signs Range for the last 24 hours  Temperature: Temp:  [98.9 °F (37.2 °C)] 98.9 °F (37.2 °C)   Temp Source: Temp src: Oral   BP: BP:  (119)/(76) 119/76   Pulse: Heart Rate:  [97] 97   Respirations:     SPO2: SpO2:  [98 %] 98 %   O2 Amount (l/min):     O2 Devices     Weight: Weight:  [83 kg (183 lb)] 83 kg (183 lb)     Physical Examination: General appearance - alert, well appearing, and in no distress  Abdomen - gravid, soft, nontender, nondistended, no masses or organomegaly  Musculoskeletal - no joint tenderness, deformity or swelling  Extremities - peripheral pulses normal, no pedal edema, no clubbing or cyanosis  Skin - normal coloration and turgor, no rashes, no suspicious skin lesions noted    Presentation: Vertex    Cervix: Exam by:     Dilation: Cervical Dilation (cm): 9   Effacement: Cervical Effacement: 100%   Station:       Fetal Heart Rate Assessment   Method:     Beats/min:     Baseline:     Variability:     Accels:     Decels:     Tracing Category:       Uterine Assessment   Method:     Frequency (min):     Ctx Count in 10 min:     Duration:     Intensity:     Intensity by IUPC:     Resting Tone:     Resting Tone by IUPC:     [place] Units:       Laboratory Results:   Results from last 7 days   Lab Units 04/10/21  1028   WBC 10*3/mm3 7.99   HEMOGLOBIN g/dL 10.6*   HEMATOCRIT % 32.8*   PLATELETS 10*3/mm3 181         Assessment/Plan       Grade II hemorrhoids    Multigravida of advanced maternal age in third trimester    Maternal anemia in pregnancy, antepartum    Pregnancy      Assessment & Plan    Assessment:  1.  Intrauterine pregnancy at 39w1d gestation with reactive, reassuring fetal status.    2.  labor  without ROM  3.  Obstetrical history significant for is non-contributory.  4.  GBS status:   Strep Gp B DC   Date Value Ref Range Status   2021 Negative Negative Final     Comment:     Centers for Disease Control and Prevention (CDC) and American Congress  of Obstetricians and Gynecologists (ACOG) guidelines for prevention of   group B streptococcal (GBS) disease specify co-collection of  a vaginal and rectal  swab specimen to maximize sensitivity of GBS  detection. Per the CDC and ACOG, swabbing both the lower vagina and  rectum substantially increases the yield of detection compared with  sampling the vagina alone.  Penicillin G, ampicillin, or cefazolin are indicated for intrapartum  prophylaxis of  GBS colonization. Reflex susceptibility  testing should be performed prior to use of clindamycin only on GBS  isolates from penicillin-allergic women who are considered a high risk  for anaphylaxis. Treatment with vancomycin without additional testing  is warranted if resistance to clindamycin is noted.         Plan:  1. fetal and uterine monitoring  continuously and expectant management  2. Plan of care has been reviewed with patient and   3.  Risks, benefits of treatment plan have been discussed.  4.  All questions have been answered.      Lynn Sevilla MD  4/10/2021  11:44 EDT    Electronically signed by Lynn Sevilla MD at 04/10/21 1147          Operative/Procedure Notes (all)      Lynn Sevilla MD at 04/10/21 1144  Version 1 of [address] [place] [address]  Vaginal Delivery Note  Adrienne Lezama  1985    Delivery     Delivery: Vaginal, Spontaneous     YOB: 2021    Time of Birth:  Gestational Age 11:26 AM   39w1d     Anesthesia: None     Delivering clinician:     Forceps?   No   Vacuum? No    Shoulder dystocia present: No        Delivery narrative:  Patient presented with regular uterine ctx and progressed on her own to complete with the desire to push. She was set up and pushed approximatetly 5 minutes to deliver a LVMI in direct occiput anterior position that restituted to maternal right. Anterior and posterior shoulder delivered without difficulty.  Nuchal cord was  reduced after delivery. Infant was bulb suctioned and after delayed cord clamping, cord was cut and infant placed on mother's chest.  Placenta delivered SI/3VC and IV pitocin, IM methergine x 1 and  fundal massage were used for hemostasis.          Infant    Findings: male  infant     Infant observations: Weight: No birth weight on file.   Length:   in  Observations/Comments:  scale 4      Apgars: 8  @ 1 minute /    9  @ 5 minutes   Infant Name: Pan     Placenta, Cord, and Fluid    Placenta delivered  Spontaneous  at   4/10/2021 11:30 AM     Cord: 3 vessels  present.   Nuchal Cord?  yes; Number of nuchal loops present:  1    Cord blood obtained: Yes    Cord gases obtained:  No    Cord gas results: Venous:  No results found for: PHCVEN    Arterial:  No results found for: PHCART     Repair    Episiotomy: Not recorded     No    Lacerations: No   Estimated Blood Loss:             Complications  Nuchal x 1    Disposition  Mother to Remain in LD  in stable condition currently.  Baby to remains with mom  in stable condition currently.      Lynn Sevilla MD  04/10/21  11:50 EDT        Electronically signed by Lynn Sevilla MD at 04/10/21 1150         Maternal Vitals (last 3 days) before discharge     Date/Time   Temp   Pulse   Resp   BP   SpO2   Weight    04/12/21 0834   98 (36.7)   69   18   102/66   --   --    04/11/21 2321   98.2 (36.8)   93   16   107/66   --   --    04/11/21 1523   98.3 (36.8)   71   16   105/73   --   --    04/11/21 0843   98.1 (36.7)   73   16   107/68   --   --    04/10/21 2327   97.5 (36.4)   79   16   108/65   --   --    04/10/21 1530   98 (36.7)   75   16   110/65   --   --    04/10/21 1430   97.3 (36.3)   76   16   115/69   --   --    04/10/21 1400   98.3 (36.8)   62   16   114/70   --   --    04/10/21 1330   --   68   --   112/68   --   --    04/10/21 1315   --   70   16   105/63   --   --    04/10/21 1300   --   72   16   109/63   --   --    04/10/21 1250   --   71   16   98/59   --   --    04/10/21 1230   --   77   16   118/73   --   --    04/10/21 1215   --   77   16   98/48   --   --    04/10/21 1200   98 (36.7)   90   16   100/64   --   --    04/10/21 1150   --   93    16   114/71   --   --    04/10/21 1050   --   97   16   119/76   98   --    04/10/21 1042   98.9 (37.2)   --   --   --   --   83 (183)            FHR (since admission)     Date/Time Fetal HR Assessment Method Fetal HR (beats/min) Fetal Heart Baseline Rate Fetal HR Variability Fetal HR Accelerations Fetal HR Decelerations Fetal HR Tracing Category    04/10/21 1126  external  135  normal range  moderate (amplitude range 6 to 25 bpm)  lasting at least 15 seconds;greater than/equal to 15 bpm  variable  --    04/10/21 1100  external  135  normal range  moderate (amplitude range 6 to 25 bpm)  greater than/equal to 15 bpm;lasting at least 15 seconds  absent  --        Uterine Activity (since admission)     Date/Time Method Contraction Frequency (Minutes) Contraction Duration (sec) Contraction Intensity Uterine Resting Tone Contraction Pattern    04/10/21 1126  external tocotransducer  1-3  60-90  strong by palpation  soft by palpation  --    04/10/21 1100  external tocotransducer  2-6    strong by palpation  soft by palpation  --        Labor Pain (since admission)     Date/Time (0-10) Pain Rating: Rest (0-10) Pain Rating: Activity Pain Management Interventions    04/12/21 1403  0  --  --    04/12/21 1201  0  --  --    04/12/21 1055  0  --  --    04/12/21 0812  0  --  --    04/12/21 0611  2  --  --    04/12/21 0408  0  --  --    04/12/21 0243  0  --  --    04/12/21 0037  1  --  see MAR    04/12/21 0007  0  --  --    04/11/21 2300  1  --  --    04/11/21 2200  0  --  --    04/11/21 2115  0  --  --    04/11/21 1857  0  --  --    04/11/21 1733  1  --  --    04/11/21 1447  0  --  --    04/11/21 0912  3  --  see MAR;quiet environment facilitated;pain management plan reviewed with patient/caregiver    04/11/21 0745  0  --  --    04/11/21 0400  0  --  --    04/11/21 0308  1  --  see MAR    04/11/21 0200  2  --  pain management plan reviewed with patient/caregiver    04/11/21 0100  6  --  see MAR    04/10/21 2100  2  --  pain  management plan reviewed with patient/caregiver;medication offered but refused    04/10/21 2012  2  --  see MAR    04/10/21 1830  0  --  --    04/10/21 1530  0  --  --    04/10/21 1430  0  --  --    04/10/21 1400  0  --  --    04/10/21 1315  3  3  --    04/10/21 1309  3  --  --    04/10/21 1300  5  5  --    04/10/21 1250  5  5  --    04/10/21 1230  5  5  --    04/10/21 1215  0  0  --    04/10/21 1200  0  0  --    04/10/21 1150  6  6  --    04/10/21 1050  --  --  --    04/10/21 1040  8  8  --           Discharge Summary      Alberto Rondon MD at 21 1413            Vaginal delivery Discharge Summary      Date of Admission: 4/10/2021    Date of Discharge:  2021    Patient: Adrienne Lezama      MR#:[id number]    Surgeon/OB: Lynn Grace Sevilla     Discharge Diagnosis: Vaginal Delivery at 39w1d, uncomplicated recovery    Procedures:  Vaginal, Spontaneous     4/10/2021    11:26 AM      Anesthesia:  None     Presenting Problem/History of Present Illness  Pregnancy [Z34.90]  Vaginal delivery [O80]     Patient Active Problem List   Diagnosis   • History of 2019 novel coronavirus disease (COVID-19)   • Gastrointestinal hemorrhage associated with anorectal source   • Grade II hemorrhoids   • Multigravida of advanced maternal age in third trimester   • Maternal anemia in pregnancy, antepartum   • Pregnancy   •  (normal spontaneous vaginal delivery)       Hospital Course  Patient is a 35 y.o. female  at 39w1d status post vaginal delivery.    Uneventful recovery.  Patient is ambulating, tolerating a regular diet.  Perineum is intact.    Infant:   male  fetus 3635 g (8 lb 0.2 oz)  with Apgar scores of 8 , 9  at five minutes.    Condition on Discharge:  Stable    Vital Signs  Temp:  [98 °F (36.7 °C)-98.3 °F (36.8 °C)] 98 °F (36.7 °C)  Heart Rate:  [69-93] 69  Resp:  [16-18] 18  BP: (102-107)/(66-73) 102/66    Lab Results   Component Value Date    WBC 7.59 2021    HGB 9.2 (L) 2021    HCT 28.7 (L)  04/11/2021    MCV 82.7 04/11/2021     04/11/2021       Discharge Disposition  Home or Self Care    Discharge Medications     Discharge Medications      New Medications      Instructions Start Date   ibuprofen 800 MG tablet  Commonly known as: ADVIL,MOTRIN   800 mg, Oral, Every 8 Hours PRN         Continue These Medications      Instructions Start Date   calcium carbonate 500 MG chewable tablet  Commonly known as: TUMS   1 tablet, Oral, As Needed      ferrous gluconate 324 MG tablet  Commonly known as: FERGON   324 mg, Oral, Daily With Breakfast      MIRALAX PO   Oral, As Needed      prenatal (CLASSIC) vitamin  tablet  Generic drug: prenatal vitamin   Oral, Daily             Discharge Diet: Regular    Activity at Discharge:     Follow-up Appointments  Future Appointments   Date Time Provider Department Center   4/13/2021  1:00 PM Basim Mead MD [facility]   4/13/2021  1:00 PM  [facility]   5/18/2021  9:30 AM Kar Tirado MD [facility] None     Additional Instructions for the Follow-ups that You Need to Schedule     Call MD for problems / concerns.   As directed      Heavy bleeding:  Greater than a pad an hour for more than 2 hours  Fever greater than 101.3   Redness of the episiotomy or vaginal laceration and/or severe pain increased from discharge pain.      Order Comments: Heavy bleeding:  Greater than a pad an hour for more than 2 hours Fever greater than 101.3 Redness of the episiotomy or vaginal laceration and/or severe pain increased from discharge pain.                   Prenatal labs/vax:   Immunization History   Administered Date(s) Administered   • Flulaval/Fluarix/Fluzone Quad 10/28/2020   • Tdap 02/03/2021         External Prenatal Results     Pregnancy Outside Results - Transcribed From Office Records - See Scanned Records For Details     Test Value Date Time    Hgb  9.2 g/dL 04/11/21 0717       10.6 g/dL 04/10/21 1028       10.1 g/dL 01/05/21 1026       12.2  g/dL 09/29/20 1215    Hct  28.7 % 04/11/21 0717       32.8 % 04/10/21 1028       30.2 % 01/05/21 1026       36.2 % 09/29/20 1215    ABO  O  04/10/21 1108    Rh  Positive  04/10/21 1108    Antibody Screen  Negative  04/10/21 1108       Negative  09/29/20 1215    Glucose Fasting GTT       Glucose Tolerance Test 1 hour       Glucose Tolerance Test 3 hour       Gonorrhea (discrete)       Chlamydia (discrete)       RPR  Non Reactive  09/29/20 1215    VDRL       Syphilis Antibody       Rubella  2.70 index 09/29/20 1215    HBsAg  Negative  09/29/20 1215    Herpes Simplex Virus PCR       Herpes Simplex VIrus Culture       HIV  Non Reactive  09/29/20 1215    Hep C RNA Quant PCR       Hep C Antibody  <0.1 s/co ratio 09/29/20 1215    AFP       Group B Strep  Negative  03/17/21 1232    GBS Susceptibility to Clindamycin       GBS Susceptibility to Erythromycin       Fetal Fibronectin       Genetic Testing, Maternal Blood             Drug Screening     Test Value Date Time    Urine Drug Screen       Amphetamine Screen       Barbiturate Screen       Benzodiazepine Screen       Methadone Screen       Phencyclidine Screen       Opiates Screen       THC Screen       Cocaine Screen       Propoxyphene Screen       Buprenorphine Screen       Methamphetamine Screen       Oxycodone Screen       Tricyclic Antidepressants Screen             Legend    ^: Historical                          Alberto Rondon MD  04/12/21  14:36 EDT              Electronically signed by Alberto Rondon MD at 04/12/21 1348

## 2021-04-12 NOTE — PLAN OF CARE
Goal Outcome Evaluation:  Plan of Care Reviewed With: patient  Progress: improving  Outcome Summary: VSS.  Pt ambulating and voiding.  Pain controlled w/ scheduled motrin.  Will continue to monitor.

## 2021-04-12 NOTE — LACTATION NOTE
Mom reports baby is nursing well but she is feeling some pinching. She wants Dr. Garcia to come in and assess baby's tongue tie today. Mom denies questions or needing assistance at this time. Educated on baby's expected output and weight gain. Gave OPLC, zoom and mommy and me info    Lactation Consult Note    Evaluation Completed: 2021 08:15 EDT  Patient Name: Adrienne Lezama  :  1985  MRN:  [id number]     REFERRAL  INFORMATION:                                         DELIVERY HISTORY:        Skin to skin initiation date/time: 4/10/2021  11:30 AM   Skin to skin end date/time: 4/10/2021  12:45 PM        MATERNAL ASSESSMENT:                               INFANT ASSESSMENT:  Information for the patient's :  Adis Lezama [[id number]]   No past medical history on file.                                                                                                     MATERNAL INFANT FEEDING:                                                                       EQUIPMENT TYPE:                                 BREAST PUMPING:          LACTATION REFERRALS:

## 2021-04-12 NOTE — PLAN OF CARE
Problem: Adult Inpatient Plan of Care  Goal: Plan of Care Review  Outcome: Met  Flowsheets  Taken 4/12/2021 1114 by Sharona Aranda, RN  Progress: improving  Outcome Summary: VSS. Denies complaints of pain. Adequate output. Discharge home today. Follow up with Dr. Mead per provider.  Taken 4/12/2021 0350 by Jitendra Rivera, RN  Plan of Care Reviewed With: patient  Goal: Patient-Specific Goal (Individualized)  Outcome: Met  Goal: Absence of Hospital-Acquired Illness or Injury  Outcome: Met  Goal: Optimal Comfort and Wellbeing  Outcome: Met  Goal: Readiness for Transition of Care  Outcome: Met     Problem: Adjustment to Role Transition (Postpartum Vaginal Delivery)  Goal: Successful Maternal Role Transition  Outcome: Met     Problem: Bleeding (Postpartum Vaginal Delivery)  Goal: Hemostasis  Outcome: Met     Problem: Infection (Postpartum Vaginal Delivery)  Goal: Absence of Infection Signs and Symptoms  Outcome: Met     Problem: Pain (Postpartum Vaginal Delivery)  Goal: Acceptable Pain Control  Outcome: Met     Problem: Urinary Retention (Postpartum Vaginal Delivery)  Goal: Effective Urinary Elimination  Outcome: Met   Goal Outcome Evaluation:     Progress: improving  Outcome Summary: VSS. Denies complaints of pain. Adequate output. Discharge home today. Follow up with Dr. Mead per provider.

## 2021-04-12 NOTE — LACTATION NOTE
This note was copied from a baby's chart.  Latch check post-frenectomy: baby is nursing well with deep, nutritive suckle. Mom denies any questions or concerns at present. Discussed OPLC, gave card.

## 2021-04-12 NOTE — PROGRESS NOTES
2021    Name:Adrienne Lezama   MR#:[id number]    Vaginal Delivery Progress Note    HD#2    Subjective   Postpartum Day 2: 35 y.o. yo Female  delivered at 39w1d  delivered a male  infant.     The patient feels well.  Her pain is controlled.    She is ambulating well.  Patient describes her bleeding as thin lochia.    Breastfeeding: without difficulty.     Patient Active Problem List   Diagnosis   • History of 2019 novel coronavirus disease (COVID-19)   • Gastrointestinal hemorrhage associated with anorectal source   • Grade II hemorrhoids   • Multigravida of advanced maternal age in third trimester   • Maternal anemia in pregnancy, antepartum   • Pregnancy       Objective   Vital Signs Range for the last 24 hours  Temp: Temp:  [98 °F (36.7 °C)-98.3 °F (36.8 °C)] 98 °F (36.7 °C) Temp src: Oral   BP: BP: (102-107)/(66-73) 102/66        Pulse: Heart Rate:  [69-93] 69  RR: Resp:  [16-18] 18  Weight: 83 kg (183 lb)  BMI:  Body mass index is 29.54 kg/m².    Results from last 7 days   Lab Units 21  0717 04/10/21  1028   WBC 10*3/mm3 7.59 7.99   HEMOGLOBIN g/dL 9.2* 10.6*   HEMATOCRIT % 28.7* 32.8*   PLATELETS 10*3/mm3 172 181         Physical Exam  Vitals and nursing note reviewed.   Constitutional:       General: She is not in acute distress.     Appearance: Normal appearance. She is well-developed. She is not ill-appearing.   HENT:      Head: Normocephalic.   Pulmonary:      Effort: Pulmonary effort is normal.   Abdominal:      General: Abdomen is flat. There is no distension.      Palpations: Abdomen is soft. There is no mass.      Tenderness: There is no abdominal tenderness.   Genitourinary:     Vagina: Normal.      Comments: Lochia is scant  Fundus is firm   Musculoskeletal:         General: No swelling or tenderness.      Comments: No calf tenderness or swelling    Neurological:      Mental Status: She is alert and oriented to person, place, and time.   Psychiatric:         Behavior: Behavior  normal.         Thought Content: Thought content normal.         Judgment: Judgment normal.         Assessment/Plan   1.  PPD# 2      Plan:   Discharge today       Alberto Rondon MD  4/12/2021 10:08 EDT

## 2021-04-16 ENCOUNTER — BULK ORDERING (OUTPATIENT)
Dept: CASE MANAGEMENT | Facility: OTHER | Age: 36
End: 2021-04-16

## 2021-04-16 DIAGNOSIS — Z23 IMMUNIZATION DUE: ICD-10-CM

## 2021-05-18 ENCOUNTER — TELEPHONE (OUTPATIENT)
Dept: GASTROENTEROLOGY | Facility: CLINIC | Age: 36
End: 2021-05-18

## 2021-05-18 ENCOUNTER — OFFICE VISIT (OUTPATIENT)
Dept: GASTROENTEROLOGY | Facility: CLINIC | Age: 36
End: 2021-05-18

## 2021-05-18 VITALS — HEIGHT: 66 IN | TEMPERATURE: 99 F | WEIGHT: 166 LBS | BODY MASS INDEX: 26.68 KG/M2

## 2021-05-18 DIAGNOSIS — K62.5 GASTROINTESTINAL HEMORRHAGE ASSOCIATED WITH ANORECTAL SOURCE: Primary | ICD-10-CM

## 2021-05-18 PROCEDURE — 99212 OFFICE O/P EST SF 10 MIN: CPT | Performed by: INTERNAL MEDICINE

## 2021-05-18 RX ORDER — HYDROCORTISONE 25 MG/G
CREAM TOPICAL 2 TIMES DAILY
Qty: 28 G | Refills: 2 | Status: SHIPPED | OUTPATIENT
Start: 2021-05-18 | End: 2021-05-28

## 2021-05-18 NOTE — PROGRESS NOTES
Chief Complaint   Patient presents with   • Rectal Bleeding   • Hemorrhoids       Adrienne Lezama is a  35 y.o. female here for a follow up visit for rectal bleeding.    HPI     Patient 35-year-old female now 1 month postpartum with ongoing rectal bleeding.  Patient using intermittent steroid cream for hemorrhoids which helps temporarily but soon returned.  Patient is anemic and iron deficient consistent with recent delivery.    History reviewed. No pertinent past medical history.      Current Outpatient Medications:   •  Docusate Sodium (STOOL SOFTENER LAXATIVE PO), Take  by mouth Daily., Disp: , Rfl:   •  ferrous gluconate (FERGON) 324 MG tablet, Take 324 mg by mouth Daily With Breakfast., Disp: , Rfl:   •  ibuprofen (ADVIL,MOTRIN) 800 MG tablet, Take 1 tablet by mouth Every 8 (Eight) Hours As Needed for Moderate Pain  for up to 40 doses., Disp: 40 tablet, Rfl: 2  •  Polyethylene Glycol 3350 (MIRALAX PO), Take  by mouth Daily., Disp: , Rfl:   •  prenatal vitamin (prenatal, CLASSIC, vitamin) tablet, Take  by mouth Daily., Disp: , Rfl:   •  VITAMIN D PO, Take  by mouth Daily., Disp: , Rfl:   •  calcium carbonate (TUMS) 500 MG chewable tablet, Chew 1 tablet As Needed for Indigestion or Heartburn., Disp: , Rfl:     No Known Allergies    Social History     Socioeconomic History   • Marital status: Single     Spouse name: Not on file   • Number of children: Not on file   • Years of education: Not on file   • Highest education level: Not on file   Tobacco Use   • Smoking status: Never Smoker   • Smokeless tobacco: Never Used   Vaping Use   • Vaping Use: Never used   Substance and Sexual Activity   • Alcohol use: Not Currently     Comment: social otherwise    • Drug use: Never   • Sexual activity: Yes     Partners: Male     Birth control/protection: None     Comment: SPOUSE = RADHA        Family History   Problem Relation Age of Onset   • Colon cancer Paternal Grandmother    • No Known Problems Daughter    • No Known  Problems Daughter    • No Known Problems Son        Review of Systems   Constitutional: Negative.    Respiratory: Negative.    Cardiovascular: Negative.    Gastrointestinal: Positive for blood in stool. Negative for abdominal distention, abdominal pain, constipation, diarrhea, nausea, rectal pain and vomiting.   Musculoskeletal: Negative.    Skin: Negative.    Hematological: Negative.        Vitals:    05/18/21 0950   Temp: 99 °F (37.2 °C)       Physical Exam  Vitals reviewed.   Constitutional:       Appearance: She is well-developed.   HENT:      Head: Normocephalic and atraumatic.   Eyes:      General: No scleral icterus.     Pupils: Pupils are equal, round, and reactive to light.   Cardiovascular:      Rate and Rhythm: Normal rate and regular rhythm.      Heart sounds: Normal heart sounds.   Pulmonary:      Effort: Pulmonary effort is normal.      Breath sounds: Normal breath sounds. No wheezing or rales.   Abdominal:      General: Bowel sounds are normal. There is no distension.      Palpations: Abdomen is soft. There is no mass.      Tenderness: There is no abdominal tenderness.      Hernia: No hernia is present.   Skin:     General: Skin is warm and dry.      Coloration: Skin is not jaundiced.      Findings: No rash.   Neurological:      General: No focal deficit present.      Mental Status: She is alert and oriented to person, place, and time.   Psychiatric:         Behavior: Behavior normal.         Thought Content: Thought content normal.         Admission on 04/10/2021, Discharged on 04/12/2021   Component Date Value Ref Range Status   • WBC 04/10/2021 7.99  3.40 - 10.80 10*3/mm3 Final   • RBC 04/10/2021 4.13  3.77 - 5.28 10*6/mm3 Final   • Hemoglobin 04/10/2021 10.6* 12.0 - 15.9 g/dL Final   • Hematocrit 04/10/2021 32.8* 34.0 - 46.6 % Final   • MCV 04/10/2021 79.4  79.0 - 97.0 fL Final   • MCH 04/10/2021 25.7* 26.6 - 33.0 pg Final   • MCHC 04/10/2021 32.3  31.5 - 35.7 g/dL Final   • RDW 04/10/2021 14.4   12.3 - 15.4 % Final   • RDW-SD 04/10/2021 41.2  37.0 - 54.0 fl Final   • MPV 04/10/2021 10.0  6.0 - 12.0 fL Final   • Platelets 04/10/2021 181  140 - 450 10*3/mm3 Final   • ABO Type 04/10/2021 O   Final   • RH type 04/10/2021 Positive   Final   • Antibody Screen 04/10/2021 Negative   Final   • T&S Expiration Date 04/10/2021 4/13/2021 11:59:59 PM   Final   • COVID19 04/10/2021 Not Detected  Not Detected - Ref. Range Final   • WBC 04/11/2021 7.59  3.40 - 10.80 10*3/mm3 Final   • RBC 04/11/2021 3.47* 3.77 - 5.28 10*6/mm3 Final   • Hemoglobin 04/11/2021 9.2* 12.0 - 15.9 g/dL Final   • Hematocrit 04/11/2021 28.7* 34.0 - 46.6 % Final   • MCV 04/11/2021 82.7  79.0 - 97.0 fL Final   • MCH 04/11/2021 26.5* 26.6 - 33.0 pg Final   • MCHC 04/11/2021 32.1  31.5 - 35.7 g/dL Final   • RDW 04/11/2021 14.5  12.3 - 15.4 % Final   • RDW-SD 04/11/2021 43.5  37.0 - 54.0 fl Final   • MPV 04/11/2021 10.6  6.0 - 12.0 fL Final   • Platelets 04/11/2021 172  140 - 450 10*3/mm3 Final   • Neutrophil % 04/11/2021 74.0  42.7 - 76.0 % Final   • Lymphocyte % 04/11/2021 18.3* 19.6 - 45.3 % Final   • Monocyte % 04/11/2021 5.9  5.0 - 12.0 % Final   • Eosinophil % 04/11/2021 1.1  0.3 - 6.2 % Final   • Basophil % 04/11/2021 0.3  0.0 - 1.5 % Final   • Immature Grans % 04/11/2021 0.4  0.0 - 0.5 % Final   • Neutrophils, Absolute 04/11/2021 5.62  1.70 - 7.00 10*3/mm3 Final   • Lymphocytes, Absolute 04/11/2021 1.39  0.70 - 3.10 10*3/mm3 Final   • Monocytes, Absolute 04/11/2021 0.45  0.10 - 0.90 10*3/mm3 Final   • Eosinophils, Absolute 04/11/2021 0.08  0.00 - 0.40 10*3/mm3 Final   • Basophils, Absolute 04/11/2021 0.02  0.00 - 0.20 10*3/mm3 Final   • Immature Grans, Absolute 04/11/2021 0.03  0.00 - 0.05 10*3/mm3 Final   • nRBC 04/11/2021 0.0  0.0 - 0.2 /100 WBC Final   Routine Prenatal on 04/07/2021   Component Date Value Ref Range Status   • Glucose, UA 04/07/2021 Negative  Negative, 1000 mg/dL (3+) mg/dL Final   • Protein, POC 04/07/2021 Negative  Negative  mg/dL Final   Routine Prenatal on 03/31/2021   Component Date Value Ref Range Status   • Glucose, UA 03/31/2021 Negative  Negative, 1000 mg/dL (3+) mg/dL Final   • Protein, POC 03/31/2021 Trace* Negative mg/dL Final   Routine Prenatal on 03/24/2021   Component Date Value Ref Range Status   • Glucose, UA 03/24/2021 Negative  Negative, 1000 mg/dL (3+) mg/dL Final   • Protein, POC 03/24/2021 Negative  Negative mg/dL Final       Diagnoses and all orders for this visit:    1. Gastrointestinal hemorrhage associated with anorectal source (Primary)  -     Case Request; Standing  -     Implement Anesthesia orders day of procedure.; Standing  -     Obtain informed consent; Standing  -     Case Request      Patient 35-year-old female with history of pregnancy x4 with ongoing rectal bleeding.  Patient with internal and external hemorrhoids waiting evaluation.  Patient now 1 month postpartum will arrange sigmoidoscopy to evaluate rectal mucosa and source of the blood loss.  Pending results will consider appropriateness of hemorrhoid procedure.  In the meantime we will begin Anusol HC 2.5% twice daily for 10 days and follow clinically.

## 2021-06-18 ENCOUNTER — TELEPHONE (OUTPATIENT)
Dept: OBSTETRICS AND GYNECOLOGY | Age: 36
End: 2021-06-18

## 2021-06-18 NOTE — TELEPHONE ENCOUNTER
Dr. Mead pt had questions about mastitis, has had it for about two weeks, and had two doses of medication, does not have a fever or redness, just curious on what to do next?  C/o soreness, hardness.

## 2021-06-21 NOTE — TELEPHONE ENCOUNTER
PT notified and will call for lactation consultant if no improvement by feeding every 2 hours, warm compress, or massaging

## 2022-04-14 ENCOUNTER — OFFICE VISIT (OUTPATIENT)
Dept: GASTROENTEROLOGY | Facility: CLINIC | Age: 37
End: 2022-04-14

## 2022-04-14 VITALS
BODY MASS INDEX: 25.43 KG/M2 | WEIGHT: 158.2 LBS | HEIGHT: 66 IN | TEMPERATURE: 96.8 F | SYSTOLIC BLOOD PRESSURE: 117 MMHG | DIASTOLIC BLOOD PRESSURE: 72 MMHG | HEART RATE: 86 BPM

## 2022-04-14 DIAGNOSIS — K62.5 RECTAL BLEEDING: Primary | ICD-10-CM

## 2022-04-14 DIAGNOSIS — K64.1 GRADE II HEMORRHOIDS: ICD-10-CM

## 2022-04-14 PROCEDURE — 99213 OFFICE O/P EST LOW 20 MIN: CPT | Performed by: INTERNAL MEDICINE

## 2022-04-14 RX ORDER — HYDROCORTISONE 25 MG/G
CREAM TOPICAL 2 TIMES DAILY
Qty: 28 G | Refills: 2 | Status: SHIPPED | OUTPATIENT
Start: 2022-04-14 | End: 2022-05-11

## 2022-04-14 NOTE — PROGRESS NOTES
Chief Complaint   Patient presents with   • Rectal Bleeding   • Constipation       Adrienne Lezama is a  36 y.o. female here for a follow up visit for rectal bleeding.    HPI     Patient 36-year-old female with history of constipation and rectal bleeding currently pregnant with her fifth child.  Patient seen last year had scheduled sigmoidoscopy but never completed the procedure due to issues with scheduling and her 4 children.  Patient returns now not truly having constipation though often feels like she is not completely empty and having difficulty initiating bowel movement.  Patient denies any nausea vomiting but is currently pregnant.    History reviewed. No pertinent past medical history.      Current Outpatient Medications:   •  ferrous gluconate (FERGON) 324 MG tablet, Take 324 mg by mouth Daily With Breakfast., Disp: , Rfl:   •  ibuprofen (ADVIL,MOTRIN) 800 MG tablet, Take 1 tablet by mouth Every 8 (Eight) Hours As Needed for Moderate Pain  for up to 40 doses., Disp: 40 tablet, Rfl: 2  •  prenatal vitamin (prenatal, CLASSIC, vitamin) tablet, Take  by mouth Daily., Disp: , Rfl:   •  calcium carbonate (TUMS) 500 MG chewable tablet, Chew 1 tablet As Needed for Indigestion or Heartburn., Disp: , Rfl:   •  Docusate Sodium (STOOL SOFTENER LAXATIVE PO), Take  by mouth Daily., Disp: , Rfl:   •  Hydrocortisone, Perianal, (Anusol-HC) 2.5 % rectal cream, Insert  into the rectum 2 (Two) Times a Day. Use externally only, Disp: 28 g, Rfl: 2  •  Polyethylene Glycol 3350 (MIRALAX PO), Take  by mouth Daily., Disp: , Rfl:   •  VITAMIN D PO, Take  by mouth Daily., Disp: , Rfl:     No Known Allergies    Social History     Socioeconomic History   • Marital status: Single   Tobacco Use   • Smoking status: Never Smoker   • Smokeless tobacco: Never Used   Vaping Use   • Vaping Use: Never used   Substance and Sexual Activity   • Alcohol use: Not Currently     Comment: social otherwise    • Drug use: Never   • Sexual activity: Yes      Partners: Male     Birth control/protection: None     Comment: SPOUSE = RADHA        Family History   Problem Relation Age of Onset   • Colon cancer Paternal Grandmother    • No Known Problems Daughter    • No Known Problems Daughter    • No Known Problems Son        Review of Systems   Constitutional: Negative.    Respiratory: Negative.    Cardiovascular: Negative.    Gastrointestinal: Positive for blood in stool and constipation. Negative for abdominal distention, abdominal pain, diarrhea, nausea, rectal pain and vomiting.   Musculoskeletal: Negative.    Skin: Negative.    Hematological: Negative.        Vitals:    04/14/22 1030   BP: 117/72   Pulse: 86   Temp: 96.8 °F (36 °C)       Physical Exam  Vitals reviewed.   Constitutional:       Appearance: Normal appearance. She is well-developed and normal weight.   HENT:      Head: Normocephalic and atraumatic.   Eyes:      General: No scleral icterus.     Pupils: Pupils are equal, round, and reactive to light.   Pulmonary:      Effort: Pulmonary effort is normal. No respiratory distress.   Abdominal:      General: Bowel sounds are normal. There is no distension.      Palpations: Abdomen is soft. There is no mass.      Tenderness: There is no abdominal tenderness.      Hernia: No hernia is present.   Skin:     General: Skin is warm and dry.      Coloration: Skin is not jaundiced.      Findings: No rash.   Neurological:      General: No focal deficit present.      Mental Status: She is alert and oriented to person, place, and time.      Cranial Nerves: No cranial nerve deficit.   Psychiatric:         Behavior: Behavior normal.         Thought Content: Thought content normal.         Judgment: Judgment normal.         No visits with results within 2 Month(s) from this visit.   Latest known visit with results is:   Admission on 04/10/2021, Discharged on 04/12/2021   Component Date Value Ref Range Status   • WBC 04/10/2021 7.99  3.40 - 10.80 10*3/mm3 Final   • RBC  04/10/2021 4.13  3.77 - 5.28 10*6/mm3 Final   • Hemoglobin 04/10/2021 10.6 (A) 12.0 - 15.9 g/dL Final   • Hematocrit 04/10/2021 32.8 (A) 34.0 - 46.6 % Final   • MCV 04/10/2021 79.4  79.0 - 97.0 fL Final   • MCH 04/10/2021 25.7 (A) 26.6 - 33.0 pg Final   • MCHC 04/10/2021 32.3  31.5 - 35.7 g/dL Final   • RDW 04/10/2021 14.4  12.3 - 15.4 % Final   • RDW-SD 04/10/2021 41.2  37.0 - 54.0 fl Final   • MPV 04/10/2021 10.0  6.0 - 12.0 fL Final   • Platelets 04/10/2021 181  140 - 450 10*3/mm3 Final   • ABO Type 04/10/2021 O   Final   • RH type 04/10/2021 Positive   Final   • Antibody Screen 04/10/2021 Negative   Final   • T&S Expiration Date 04/10/2021 4/13/2021 11:59:59 PM   Final   • COVID19 04/10/2021 Not Detected  Not Detected - Ref. Range Final   • WBC 04/11/2021 7.59  3.40 - 10.80 10*3/mm3 Final   • RBC 04/11/2021 3.47 (A) 3.77 - 5.28 10*6/mm3 Final   • Hemoglobin 04/11/2021 9.2 (A) 12.0 - 15.9 g/dL Final   • Hematocrit 04/11/2021 28.7 (A) 34.0 - 46.6 % Final   • MCV 04/11/2021 82.7  79.0 - 97.0 fL Final   • MCH 04/11/2021 26.5 (A) 26.6 - 33.0 pg Final   • MCHC 04/11/2021 32.1  31.5 - 35.7 g/dL Final   • RDW 04/11/2021 14.5  12.3 - 15.4 % Final   • RDW-SD 04/11/2021 43.5  37.0 - 54.0 fl Final   • MPV 04/11/2021 10.6  6.0 - 12.0 fL Final   • Platelets 04/11/2021 172  140 - 450 10*3/mm3 Final   • Neutrophil % 04/11/2021 74.0  42.7 - 76.0 % Final   • Lymphocyte % 04/11/2021 18.3 (A) 19.6 - 45.3 % Final   • Monocyte % 04/11/2021 5.9  5.0 - 12.0 % Final   • Eosinophil % 04/11/2021 1.1  0.3 - 6.2 % Final   • Basophil % 04/11/2021 0.3  0.0 - 1.5 % Final   • Immature Grans % 04/11/2021 0.4  0.0 - 0.5 % Final   • Neutrophils, Absolute 04/11/2021 5.62  1.70 - 7.00 10*3/mm3 Final   • Lymphocytes, Absolute 04/11/2021 1.39  0.70 - 3.10 10*3/mm3 Final   • Monocytes, Absolute 04/11/2021 0.45  0.10 - 0.90 10*3/mm3 Final   • Eosinophils, Absolute 04/11/2021 0.08  0.00 - 0.40 10*3/mm3 Final   • Basophils, Absolute 04/11/2021 0.02  0.00  - 0.20 10*3/mm3 Final   • Immature Grans, Absolute 04/11/2021 0.03  0.00 - 0.05 10*3/mm3 Final   • nRBC 04/11/2021 0.0  0.0 - 0.2 /100 WBC Final       Diagnoses and all orders for this visit:    1. Rectal bleeding (Primary)  -     Ambulatory Referral to Nutrition Services    2. Grade II hemorrhoids  -     Ambulatory Referral to Nutrition Services    Other orders  -     Hydrocortisone, Perianal, (Anusol-HC) 2.5 % rectal cream; Insert  into the rectum 2 (Two) Times a Day. Use externally only  Dispense: 28 g; Refill: 2      Patient 36-year-old female with 4 vaginal deliveries now pregnant with her fifth.  Patient seen last year with recurrent rectal bleeding felt hemorrhoidal.  Patient scheduled for sigmoidoscopy to evaluate but unfortunately was unable to make appointment due to family situation now returns pregnant again with her fifth child.  Patient with ongoing intermittent rectal bleeding urgency and constipation.  Patient denies any nausea vomiting no melena noted.  Patient denies any shortness of breath or chest pain.  At this point will again treat topically for her hemorrhoids, patient requesting nutrition consult is well, will put the request in.

## 2022-05-11 ENCOUNTER — OFFICE VISIT (OUTPATIENT)
Dept: OBSTETRICS AND GYNECOLOGY | Age: 37
End: 2022-05-11

## 2022-05-11 VITALS
DIASTOLIC BLOOD PRESSURE: 64 MMHG | BODY MASS INDEX: 26.2 KG/M2 | SYSTOLIC BLOOD PRESSURE: 102 MMHG | HEIGHT: 66 IN | WEIGHT: 163 LBS

## 2022-05-11 DIAGNOSIS — O09.521 MULTIGRAVIDA OF ADVANCED MATERNAL AGE IN FIRST TRIMESTER: ICD-10-CM

## 2022-05-11 DIAGNOSIS — Z34.90 EARLY STAGE OF PREGNANCY: Primary | ICD-10-CM

## 2022-05-11 PROCEDURE — 99213 OFFICE O/P EST LOW 20 MIN: CPT | Performed by: PHYSICIAN ASSISTANT

## 2022-05-11 RX ORDER — LANOLIN ALCOHOL/MO/W.PET/CERES
50 CREAM (GRAM) TOPICAL DAILY
COMMUNITY
End: 2022-08-31

## 2022-05-11 NOTE — PROGRESS NOTES
"Subjective     Chief Complaint   Patient presents with   • Gynecologic Exam     Pregnancy confirmation with ultrasound       Adrienne Lezama is a 36 y.o.  whose LMP is Patient's last menstrual period was 2022 (exact date). 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    No new med history      No Additional Complaints Reported    The following portions of the patient's history were reviewed and updated as appropriate:vital signs, allergies, current medications, past family history, past medical history, past social history, past surgical history and problem list      Review of Systems   Genitourinary:positive for early stage of pregnancy     Objective      /64   Ht 167.6 cm (66\")   Wt 73.9 kg (163 lb)   LMP 2022 (Exact Date)   Breastfeeding No   BMI 26.31 kg/m²     Physical Exam    General:   alert, comfortable and no distress   Heart: Not performed today   Lungs: Not performed today.   Breast: Not performed today   Neck: Not performed today   Abdomen: Not performed today   CVA: Not performed today   Pelvis: Not performed today   Extremities: Not performed today   Neurologic: negative   Psychiatric: Normal affect, judgement, and mood       Lab Review   Labs: No data reviewed    Imaging   Ultrasound - Pelvic Vaginal  Intrauterine pregnancy at Unknown  Viable first trimester gestation,   Basis for EDC: Ultrasound   U/S reveals EVER of 2022    Assessment & Plan     ASSESSMENT  1. Early stage of pregnancy    2. Multigravida of advanced maternal age in first trimester          PLAN  1.   Orders Placed This Encounter   Procedures   • Urine Culture - Urine, Urine, Random Void   • HIV-1 / O / 2 Ag / Antibody 4th Generation   • Hepatitis B Surface Antigen   • Hemoglobin A1c   • Rubella Antibody, IgG "   • RPR, Rfx Qn RPR / Confirm TP   • ABO / Rh   • Antibody Screen   • CBC & Differential       2. Note given to pt for SNAP benefits. Dental form given as well. She plans to do labs later, will send urine today. New ob folder given. All questions answered. To call with any issues    Follow up: 3 week(s)    JASPER Barnett  5/11/2022

## 2022-05-13 LAB
BACTERIA UR CULT: NORMAL
BACTERIA UR CULT: NORMAL

## 2022-06-01 ENCOUNTER — ROUTINE PRENATAL (OUTPATIENT)
Dept: OBSTETRICS AND GYNECOLOGY | Age: 37
End: 2022-06-01

## 2022-06-01 VITALS — WEIGHT: 159 LBS | SYSTOLIC BLOOD PRESSURE: 114 MMHG | BODY MASS INDEX: 25.66 KG/M2 | DIASTOLIC BLOOD PRESSURE: 72 MMHG

## 2022-06-01 DIAGNOSIS — Z34.81 PRENATAL CARE, SUBSEQUENT PREGNANCY, FIRST TRIMESTER: Primary | ICD-10-CM

## 2022-06-01 DIAGNOSIS — O99.019 MATERNAL ANEMIA IN PREGNANCY, ANTEPARTUM: ICD-10-CM

## 2022-06-01 DIAGNOSIS — O09.523 MULTIGRAVIDA OF ADVANCED MATERNAL AGE IN THIRD TRIMESTER: ICD-10-CM

## 2022-06-01 DIAGNOSIS — K64.1 GRADE II HEMORRHOIDS: ICD-10-CM

## 2022-06-01 LAB
CLARITY, POC: CLEAR
COLOR UR: YELLOW
GLUCOSE UR STRIP-MCNC: NEGATIVE MG/DL
PROT UR STRIP-MCNC: NEGATIVE MG/DL

## 2022-06-01 PROCEDURE — 99213 OFFICE O/P EST LOW 20 MIN: CPT | Performed by: OBSTETRICS & GYNECOLOGY

## 2022-06-01 NOTE — PROGRESS NOTES
Chief Complaint   Patient presents with   • Routine Prenatal Visit     HPI- Pt is 36 y.o.  at 14w5d here for prenatal visit.     ROS-     - No vaginal bleeding    GI- No abdominal pain    /72   Wt 72.1 kg (159 lb)   LMP 2022 (Approximate)   BMI 25.66 kg/m²   Exam - See flow sheet    Fetal heart rate is normal    Assessment-  Diagnoses and all orders for this visit:    Prenatal care, subsequent pregnancy, first trimester  -     POC Urinalysis Dipstick    Multigravida of advanced maternal age in third trimester  -     [id number] PLUS Core - Blood,  -     OB Panel With HIV  -     TSH    Maternal anemia in pregnancy, antepartum    Grade II hemorrhoids    New OB lab work, cell free DNA

## 2022-06-06 LAB
ABO GROUP BLD: ABNORMAL
BASOPHILS # BLD AUTO: 0 X10E3/UL (ref 0–0.2)
BASOPHILS NFR BLD AUTO: 0 %
BLD GP AB SCN SERPL QL: NEGATIVE
CFDNA.FET/CFDNA.TOTAL SFR FETUS: NORMAL %
CITATION REF LAB TEST: NORMAL
EOSINOPHIL # BLD AUTO: 0.1 X10E3/UL (ref 0–0.4)
EOSINOPHIL NFR BLD AUTO: 2 %
ERYTHROCYTE [DISTWIDTH] IN BLOOD BY AUTOMATED COUNT: 13.6 % (ref 11.7–15.4)
FET 13+18+21+X+Y ANEUP PLAS.CFDNA: NEGATIVE
FET CHR 21 TS PLAS.CFDNA QL: NEGATIVE
FET SEX PLAS.CFDNA DOSAGE CFDNA: NORMAL
FET TS 13 RISK PLAS.CFDNA QL: NEGATIVE
FET TS 18 RISK WBC.DNA+CFDNA QL: NEGATIVE
GA EST FROM CONCEPTION DATE: NORMAL D
GESTATIONAL AGE > 9:: YES
HBV SURFACE AG SERPL QL IA: NEGATIVE
HCT VFR BLD AUTO: 37.5 % (ref 34–46.6)
HCV AB S/CO SERPL IA: <0.1 S/CO RATIO (ref 0–0.9)
HGB BLD-MCNC: 12.6 G/DL (ref 11.1–15.9)
HIV 1+2 AB+HIV1 P24 AG SERPL QL IA: NON REACTIVE
IMM GRANULOCYTES # BLD AUTO: 0 X10E3/UL (ref 0–0.1)
IMM GRANULOCYTES NFR BLD AUTO: 0 %
LAB DIRECTOR NAME PROVIDER: NORMAL
LAB DIRECTOR NAME PROVIDER: NORMAL
LABORATORY COMMENT REPORT: NORMAL
LIMITATIONS OF THE TEST: NORMAL
LYMPHOCYTES # BLD AUTO: 1.1 X10E3/UL (ref 0.7–3.1)
LYMPHOCYTES NFR BLD AUTO: 34 %
MCH RBC QN AUTO: 29.8 PG (ref 26.6–33)
MCHC RBC AUTO-ENTMCNC: 33.6 G/DL (ref 31.5–35.7)
MCV RBC AUTO: 89 FL (ref 79–97)
MONOCYTES # BLD AUTO: 0.2 X10E3/UL (ref 0.1–0.9)
MONOCYTES NFR BLD AUTO: 7 %
NEGATIVE PREDICTIVE VALUE: NORMAL
NEUTROPHILS # BLD AUTO: 1.8 X10E3/UL (ref 1.4–7)
NEUTROPHILS NFR BLD AUTO: 57 %
NOTE: NORMAL
PERFORMANCE CHARACTERISTICS: NORMAL
PLATELET # BLD AUTO: 164 X10E3/UL (ref 150–450)
POSITIVE PREDICTIVE VALUE: NORMAL
RBC # BLD AUTO: 4.23 X10E6/UL (ref 3.77–5.28)
REF LAB TEST METHOD: NORMAL
RH BLD: POSITIVE
RPR SER QL: NON REACTIVE
RUBV IGG SERPL IA-ACNC: 2.18 INDEX
TEST PERFORMANCE INFO SPEC: NORMAL
TSH SERPL DL<=0.005 MIU/L-ACNC: 0.66 UIU/ML (ref 0.45–4.5)
WBC # BLD AUTO: 3.1 X10E3/UL (ref 3.4–10.8)

## 2022-06-09 ENCOUNTER — TELEPHONE (OUTPATIENT)
Dept: OBSTETRICS AND GYNECOLOGY | Age: 37
End: 2022-06-09

## 2022-06-09 NOTE — TELEPHONE ENCOUNTER
----- Message from Basim Mead MD sent at 6/6/2022  4:45 PM EDT -----   Please notify pt. That labs are with in normal limits

## 2022-06-29 ENCOUNTER — ROUTINE PRENATAL (OUTPATIENT)
Dept: OBSTETRICS AND GYNECOLOGY | Age: 37
End: 2022-06-29

## 2022-06-29 VITALS — DIASTOLIC BLOOD PRESSURE: 62 MMHG | WEIGHT: 159 LBS | SYSTOLIC BLOOD PRESSURE: 120 MMHG | BODY MASS INDEX: 25.66 KG/M2

## 2022-06-29 DIAGNOSIS — Z34.82 PRENATAL CARE, SUBSEQUENT PREGNANCY, SECOND TRIMESTER: Primary | ICD-10-CM

## 2022-06-29 DIAGNOSIS — O09.523 MULTIGRAVIDA OF ADVANCED MATERNAL AGE IN THIRD TRIMESTER: ICD-10-CM

## 2022-06-29 PROCEDURE — 99213 OFFICE O/P EST LOW 20 MIN: CPT | Performed by: OBSTETRICS & GYNECOLOGY

## 2022-06-29 RX ORDER — ASPIRIN 81 MG/1
81 TABLET ORAL DAILY
Qty: 90 TABLET | Refills: 2
Start: 2022-06-29

## 2022-08-02 ENCOUNTER — ROUTINE PRENATAL (OUTPATIENT)
Dept: OBSTETRICS AND GYNECOLOGY | Age: 37
End: 2022-08-02

## 2022-08-02 VITALS — DIASTOLIC BLOOD PRESSURE: 60 MMHG | BODY MASS INDEX: 25.99 KG/M2 | SYSTOLIC BLOOD PRESSURE: 118 MMHG | WEIGHT: 161 LBS

## 2022-08-02 DIAGNOSIS — Z36.89 SCREENING, ANTENATAL, FOR FETAL ANATOMIC SURVEY: ICD-10-CM

## 2022-08-02 DIAGNOSIS — Z36.86 ENCOUNTER FOR ANTENATAL SCREENING FOR CERVICAL LENGTH: ICD-10-CM

## 2022-08-02 DIAGNOSIS — O99.019 MATERNAL ANEMIA IN PREGNANCY, ANTEPARTUM: ICD-10-CM

## 2022-08-02 DIAGNOSIS — Z86.16 HISTORY OF 2019 NOVEL CORONAVIRUS DISEASE (COVID-19): ICD-10-CM

## 2022-08-02 DIAGNOSIS — Z34.82 PRENATAL CARE, SUBSEQUENT PREGNANCY, SECOND TRIMESTER: Primary | ICD-10-CM

## 2022-08-02 PROCEDURE — 76817 TRANSVAGINAL US OBSTETRIC: CPT | Performed by: OBSTETRICS & GYNECOLOGY

## 2022-08-02 PROCEDURE — 99213 OFFICE O/P EST LOW 20 MIN: CPT | Performed by: OBSTETRICS & GYNECOLOGY

## 2022-08-02 PROCEDURE — 76805 OB US >/= 14 WKS SNGL FETUS: CPT | Performed by: OBSTETRICS & GYNECOLOGY

## 2022-08-02 NOTE — PROGRESS NOTES
Chief Complaint   Patient presents with   • Pregnancy Ultrasound     HPI- Pt is 36 y.o.  at 23w4d here for prenatal visit.     ROS-     - No vaginal bleeding    GI- No abdominal pain    /60   Wt 73 kg (161 lb)   LMP 2022 (Approximate)   BMI 25.99 kg/m²   Exam - See flow sheet    Fetal heart rate is normal    Assessment-  Diagnoses and all orders for this visit:    Prenatal care, subsequent pregnancy, second trimester  -     POC Urinalysis Dipstick    Screening, , for fetal anatomic survey  -     US Ob 14 + Weeks Single or First Gestation  -     POC Urinalysis Dipstick    Encounter for  screening for cervical length  -     US Ob Transvaginal  -     POC Urinalysis Dipstick    Maternal anemia in pregnancy, antepartum    History of 2019 novel coronavirus disease (COVID-19)    Feeling better, reviewed today's ultrasound,  Leukos hemoglobin Tdap next visit    Check ultrasound at 32 weeks

## 2022-08-23 ENCOUNTER — TELEPHONE (OUTPATIENT)
Dept: OBSTETRICS AND GYNECOLOGY | Age: 37
End: 2022-08-23

## 2022-08-31 ENCOUNTER — ROUTINE PRENATAL (OUTPATIENT)
Dept: OBSTETRICS AND GYNECOLOGY | Age: 37
End: 2022-08-31

## 2022-08-31 VITALS — DIASTOLIC BLOOD PRESSURE: 64 MMHG | SYSTOLIC BLOOD PRESSURE: 116 MMHG | BODY MASS INDEX: 26.47 KG/M2 | WEIGHT: 164 LBS

## 2022-08-31 DIAGNOSIS — Z13.89 SCREENING FOR BLOOD OR PROTEIN IN URINE: ICD-10-CM

## 2022-08-31 DIAGNOSIS — Z3A.27 27 WEEKS GESTATION OF PREGNANCY: Primary | ICD-10-CM

## 2022-08-31 LAB
GLUCOSE UR STRIP-MCNC: NEGATIVE MG/DL
PROT UR STRIP-MCNC: NEGATIVE MG/DL

## 2022-08-31 PROCEDURE — 90715 TDAP VACCINE 7 YRS/> IM: CPT | Performed by: NURSE PRACTITIONER

## 2022-08-31 PROCEDURE — 99213 OFFICE O/P EST LOW 20 MIN: CPT | Performed by: NURSE PRACTITIONER

## 2022-08-31 PROCEDURE — 90471 IMMUNIZATION ADMIN: CPT | Performed by: NURSE PRACTITIONER

## 2022-08-31 NOTE — PROGRESS NOTES
Chief Complaint   Patient presents with   • Routine Prenatal Visit     Passed kidney stone 2 weeks       HPI: 36 y.o.  at 27w5d     Doing well  Reports good FM  Denies LOF, bleeding or ctx's  Has no concerns or complaints today  Pt of Dr. [place] Cardona:    22 0952   BP: 116/64   Weight: 74.4 kg (164 lb)       ROS:  GI:  Negative  : Negative  Pulmonary: Negative     A/P  1. Intrauterine pregnancy at 27w5d   2. Pregnancy Risk:  NORMAL    Diagnoses and all orders for this visit:    1. 27 weeks gestation of pregnancy (Primary)  -     Gestational Screen 1 Hr ([facility])  -     CBC & Differential    2. Screening for blood or protein in urine  -     POC Urinalysis Dipstick    Other orders  -     Tdap Vaccine Greater Than or Equal To 6yo IM        -----------------------  PLAN:   1 hour gtt, cbc and tdap today  Pt is Rh +  PTL/FKC discussed  F/u 2 weeks  Return for Next scheduled follow up.      Luisa Connolly, APRN  2022 10:14 EDT

## 2022-09-01 LAB
BASOPHILS # BLD AUTO: 0.01 10*3/MM3 (ref 0–0.2)
BASOPHILS NFR BLD AUTO: 0.3 % (ref 0–1.5)
EOSINOPHIL # BLD AUTO: 0.06 10*3/MM3 (ref 0–0.4)
EOSINOPHIL NFR BLD AUTO: 1.6 % (ref 0.3–6.2)
ERYTHROCYTE [DISTWIDTH] IN BLOOD BY AUTOMATED COUNT: 12.9 % (ref 12.3–15.4)
GLUCOSE 1H P 50 G GLC PO SERPL-MCNC: 90 MG/DL (ref 65–139)
HCT VFR BLD AUTO: 33.1 % (ref 34–46.6)
HGB BLD-MCNC: 11.4 G/DL (ref 12–15.9)
IMM GRANULOCYTES # BLD AUTO: 0.01 10*3/MM3 (ref 0–0.05)
IMM GRANULOCYTES NFR BLD AUTO: 0.3 % (ref 0–0.5)
LYMPHOCYTES # BLD AUTO: 0.64 10*3/MM3 (ref 0.7–3.1)
LYMPHOCYTES NFR BLD AUTO: 17.6 % (ref 19.6–45.3)
MCH RBC QN AUTO: 30.9 PG (ref 26.6–33)
MCHC RBC AUTO-ENTMCNC: 34.4 G/DL (ref 31.5–35.7)
MCV RBC AUTO: 89.7 FL (ref 79–97)
MONOCYTES # BLD AUTO: 0.29 10*3/MM3 (ref 0.1–0.9)
MONOCYTES NFR BLD AUTO: 8 % (ref 5–12)
NEUTROPHILS # BLD AUTO: 2.63 10*3/MM3 (ref 1.7–7)
NEUTROPHILS NFR BLD AUTO: 72.2 % (ref 42.7–76)
NRBC BLD AUTO-RTO: 0 /100 WBC (ref 0–0.2)
PLATELET # BLD AUTO: 144 10*3/MM3 (ref 140–450)
RBC # BLD AUTO: 3.69 10*6/MM3 (ref 3.77–5.28)
WBC # BLD AUTO: 3.64 10*3/MM3 (ref 3.4–10.8)

## 2022-09-14 ENCOUNTER — ROUTINE PRENATAL (OUTPATIENT)
Dept: OBSTETRICS AND GYNECOLOGY | Age: 37
End: 2022-09-14

## 2022-09-14 VITALS — SYSTOLIC BLOOD PRESSURE: 120 MMHG | WEIGHT: 169.4 LBS | BODY MASS INDEX: 27.34 KG/M2 | DIASTOLIC BLOOD PRESSURE: 60 MMHG

## 2022-09-14 DIAGNOSIS — O09.523 MULTIGRAVIDA OF ADVANCED MATERNAL AGE IN THIRD TRIMESTER: ICD-10-CM

## 2022-09-14 DIAGNOSIS — O99.019 MATERNAL ANEMIA IN PREGNANCY, ANTEPARTUM: ICD-10-CM

## 2022-09-14 DIAGNOSIS — Z3A.29 29 WEEKS GESTATION OF PREGNANCY: Primary | ICD-10-CM

## 2022-09-14 LAB
GLUCOSE UR STRIP-MCNC: NEGATIVE MG/DL
PROT UR STRIP-MCNC: NEGATIVE MG/DL

## 2022-09-14 PROCEDURE — 99213 OFFICE O/P EST LOW 20 MIN: CPT | Performed by: PHYSICIAN ASSISTANT

## 2022-09-14 NOTE — PROGRESS NOTES
Chief Complaint   Patient presents with   • Routine Prenatal Visit     OB Check, Pt is 29w5d, Pt has no complaints today        HPI: 36 y.o.  at 29w5d gestation  She is here for routine ob visit  BP wnl at 120/60, UA is neg/neg  Will plan for flu shot next visit    Vitals:    22 0909   BP: 120/60   Weight: 76.8 kg (169 lb 6.4 oz)       ROS:  GI:  Negative  : na  Pulmonary: Negative     A/P  1. Intrauterine pregnancy at 29w5d   2. Pregnancy Risk:  HIGH RISK    Diagnoses and all orders for this visit:    1. 29 weeks gestation of pregnancy (Primary)  -     POC Urinalysis Dipstick    2. Multigravida of advanced maternal age in third trimester    3. Maternal anemia in pregnancy, antepartum        -----------------------  PLAN:   No follow-ups on file.      JASPER Barnett  2022 09:26 EDT

## 2022-09-28 ENCOUNTER — ROUTINE PRENATAL (OUTPATIENT)
Dept: OBSTETRICS AND GYNECOLOGY | Age: 37
End: 2022-09-28

## 2022-09-28 VITALS — SYSTOLIC BLOOD PRESSURE: 120 MMHG | DIASTOLIC BLOOD PRESSURE: 68 MMHG | BODY MASS INDEX: 27.92 KG/M2 | WEIGHT: 173 LBS

## 2022-09-28 DIAGNOSIS — Z86.16 HISTORY OF 2019 NOVEL CORONAVIRUS DISEASE (COVID-19): ICD-10-CM

## 2022-09-28 DIAGNOSIS — O09.523 MULTIGRAVIDA OF ADVANCED MATERNAL AGE IN THIRD TRIMESTER: ICD-10-CM

## 2022-09-28 DIAGNOSIS — Z34.83 PRENATAL CARE, SUBSEQUENT PREGNANCY, THIRD TRIMESTER: Primary | ICD-10-CM

## 2022-09-28 PROBLEM — K62.5 GASTROINTESTINAL HEMORRHAGE ASSOCIATED WITH ANORECTAL SOURCE: Status: RESOLVED | Noted: 2020-12-17 | Resolved: 2022-09-28

## 2022-09-28 PROCEDURE — 99213 OFFICE O/P EST LOW 20 MIN: CPT | Performed by: OBSTETRICS & GYNECOLOGY

## 2022-09-28 NOTE — PROGRESS NOTES
Chief Complaint   Patient presents with   • Pregnancy Ultrasound     HPI- Pt is 36 y.o.  at 31w5d here for prenatal visit.     ROS-     - No vaginal bleeding    GI- No abdominal pain    /68   Wt 78.5 kg (173 lb)   LMP 2022 (Approximate)   BMI 27.92 kg/m²   Exam - See flow sheet    Fetal heart rate is normal    Assessment-  Diagnoses and all orders for this visit:    Prenatal care, subsequent pregnancy, third trimester  -     POC Urinalysis Dipstick    Multigravida of advanced maternal age in third trimester    History of 2019 novel coronavirus disease (COVID-19)    Ultrasound today  Reviewed signs symptoms of labor  Discussed possibility of ambulating through most of her labor, pros and cons of not receiving Pitocin after delivery.  Will continue baby aspirin prenatal vitamins supplemental iron.  Patient will call if she would like our help for contacting brandy Mendoza.

## 2022-10-12 ENCOUNTER — ROUTINE PRENATAL (OUTPATIENT)
Dept: OBSTETRICS AND GYNECOLOGY | Age: 37
End: 2022-10-12

## 2022-10-12 VITALS — WEIGHT: 175 LBS | DIASTOLIC BLOOD PRESSURE: 66 MMHG | BODY MASS INDEX: 28.25 KG/M2 | SYSTOLIC BLOOD PRESSURE: 116 MMHG

## 2022-10-12 DIAGNOSIS — Z34.83 PRENATAL CARE, SUBSEQUENT PREGNANCY, THIRD TRIMESTER: Primary | ICD-10-CM

## 2022-10-12 PROCEDURE — 99213 OFFICE O/P EST LOW 20 MIN: CPT | Performed by: OBSTETRICS & GYNECOLOGY

## 2022-10-12 PROCEDURE — 90686 IIV4 VACC NO PRSV 0.5 ML IM: CPT | Performed by: OBSTETRICS & GYNECOLOGY

## 2022-10-12 PROCEDURE — 90471 IMMUNIZATION ADMIN: CPT | Performed by: OBSTETRICS & GYNECOLOGY

## 2022-10-12 NOTE — PROGRESS NOTES
Chief Complaint   Patient presents with   • Routine Prenatal Visit     HPI- Pt is 36 y.o.  at 33w5d here for prenatal visit.     ROS-     - No vaginal bleeding    GI- No abdominal pain    /66   Wt 79.4 kg (175 lb)   LMP 2022 (Approximate)   BMI 28.25 kg/m²   Exam - See flow sheet    Fetal heart rate is normal    Assessment-  Diagnoses and all orders for this visit:    Prenatal care, subsequent pregnancy, third trimester  -     POC Urinalysis Dipstick    Other orders  -     FluLaval/Fluarix/Fluzone >6 Months    Patient reports good fetal movement  Minimal [place] Ochoa contractions  Again reviewed hospital protocols, recommended patient preregister.  Return the office in 2 weeks time  Start BPP's in 3 weeks

## 2022-10-26 ENCOUNTER — ROUTINE PRENATAL (OUTPATIENT)
Dept: OBSTETRICS AND GYNECOLOGY | Age: 37
End: 2022-10-26

## 2022-10-26 VITALS — SYSTOLIC BLOOD PRESSURE: 112 MMHG | WEIGHT: 177 LBS | DIASTOLIC BLOOD PRESSURE: 62 MMHG | BODY MASS INDEX: 28.57 KG/M2

## 2022-10-26 DIAGNOSIS — Z13.89 SCREENING FOR BLOOD OR PROTEIN IN URINE: Primary | ICD-10-CM

## 2022-10-26 DIAGNOSIS — Z3A.35 35 WEEKS GESTATION OF PREGNANCY: ICD-10-CM

## 2022-10-26 PROCEDURE — 99213 OFFICE O/P EST LOW 20 MIN: CPT | Performed by: PHYSICIAN ASSISTANT

## 2022-10-26 NOTE — PROGRESS NOTES
Chief Complaint   Patient presents with   • Routine Prenatal Visit     35 week ob       HPI: 36 y.o.  at 35w5d gestation  She is doing well  Has good FM  occl BHC  utd on flu vaccine  Has BPP scheduled for next week  No c/o  Call for any issues    Vitals:    10/26/22 1008   BP: 112/62   Weight: 80.3 kg (177 lb)       ROS:  GI:  Negative  : na  Pulmonary: Negative     A/P  1. Intrauterine pregnancy at 35w5d   2. Pregnancy Risk:  HIGH RISK    Diagnoses and all orders for this visit:    1. Screening for blood or protein in urine (Primary)  -     POC Urinalysis Dipstick, Multipro    2. 35 weeks gestation of pregnancy  -     POC Urinalysis Dipstick, Multipro        -----------------------  PLAN:   No follow-ups on file.      JASPER Barnett  10/26/2022 10:20 EDT

## 2022-11-03 ENCOUNTER — ROUTINE PRENATAL (OUTPATIENT)
Dept: OBSTETRICS AND GYNECOLOGY | Age: 37
End: 2022-11-03

## 2022-11-03 VITALS — WEIGHT: 177 LBS | DIASTOLIC BLOOD PRESSURE: 64 MMHG | BODY MASS INDEX: 28.57 KG/M2 | SYSTOLIC BLOOD PRESSURE: 112 MMHG

## 2022-11-03 DIAGNOSIS — Z36.85 ENCOUNTER FOR ANTENATAL SCREENING FOR STREPTOCOCCUS B: ICD-10-CM

## 2022-11-03 DIAGNOSIS — U07.1 COVID-19 AFFECTING PREGNANCY, ANTEPARTUM: ICD-10-CM

## 2022-11-03 DIAGNOSIS — O98.519 COVID-19 AFFECTING PREGNANCY, ANTEPARTUM: ICD-10-CM

## 2022-11-03 DIAGNOSIS — Z86.16 HISTORY OF 2019 NOVEL CORONAVIRUS DISEASE (COVID-19): ICD-10-CM

## 2022-11-03 DIAGNOSIS — O09.513 AMA (ADVANCED MATERNAL AGE) PRIMIGRAVIDA 35+, THIRD TRIMESTER: Primary | ICD-10-CM

## 2022-11-03 DIAGNOSIS — Z13.89 SCREENING FOR BLOOD OR PROTEIN IN URINE: ICD-10-CM

## 2022-11-03 DIAGNOSIS — O99.019 MATERNAL ANEMIA IN PREGNANCY, ANTEPARTUM: ICD-10-CM

## 2022-11-03 LAB
GLUCOSE UR STRIP-MCNC: NEGATIVE MG/DL
PROT UR STRIP-MCNC: NEGATIVE MG/DL

## 2022-11-03 PROCEDURE — 76816 OB US FOLLOW-UP PER FETUS: CPT | Performed by: OBSTETRICS & GYNECOLOGY

## 2022-11-03 PROCEDURE — 99213 OFFICE O/P EST LOW 20 MIN: CPT | Performed by: NURSE PRACTITIONER

## 2022-11-03 PROCEDURE — 76819 FETAL BIOPHYS PROFIL W/O NST: CPT | Performed by: OBSTETRICS & GYNECOLOGY

## 2022-11-03 NOTE — PROGRESS NOTES
Chief Complaint   Patient presents with   • Routine Prenatal Visit       HPI: 37 y.o.  at 36w6d     Doing well  Reports good FM  Denies LOF, bleeding or ctx's  AMA, hx of covid - continue asa  Pt of Dr. Mead    Vitals:    22 1052   BP: 112/64   Weight: 80.3 kg (177 lb)       ROS:  GI:  Negative  : Negative  Pulmonary: Negative     A/P  1. Intrauterine pregnancy at 36w6d   2. Pregnancy Risk:  HIGH RISK    Diagnoses and all orders for this visit:    1. AMA (advanced maternal age) primigravida 35+, third trimester (Primary)  -     US fetal biophysical profile wo non stress testing    2. COVID-19 affecting pregnancy, antepartum  -     US ob follow up transabdominal approach    3. Screening for blood or protein in urine  -     POC Urinalysis Dipstick    4. Encounter for  screening for Streptococcus B  -     Strep B Screen - Swab, Vaginal/Rectum    5. History of 2019 novel coronavirus disease (COVID-19)    6. Maternal anemia in pregnancy, antepartum        -----------------------  PLAN:   Growth - large AC  BPP   GBS done  Continue baby asa  Weekly BPP's  Labor warnings/FKC discussed  Return for Next scheduled follow up.      Luisa Connolly, VICKY  11/3/2022 11:12 EDT

## 2022-11-05 LAB — GP B STREP DNA SPEC QL NAA+PROBE: NEGATIVE

## 2022-11-09 ENCOUNTER — ROUTINE PRENATAL (OUTPATIENT)
Dept: OBSTETRICS AND GYNECOLOGY | Age: 37
End: 2022-11-09

## 2022-11-09 VITALS — BODY MASS INDEX: 28.73 KG/M2 | DIASTOLIC BLOOD PRESSURE: 68 MMHG | WEIGHT: 178 LBS | SYSTOLIC BLOOD PRESSURE: 112 MMHG

## 2022-11-09 DIAGNOSIS — Z13.89 SCREENING FOR BLOOD OR PROTEIN IN URINE: Primary | ICD-10-CM

## 2022-11-09 DIAGNOSIS — O09.513 AMA (ADVANCED MATERNAL AGE) PRIMIGRAVIDA 35+, THIRD TRIMESTER: ICD-10-CM

## 2022-11-09 LAB
GLUCOSE UR STRIP-MCNC: NEGATIVE MG/DL
PROT UR STRIP-MCNC: NEGATIVE MG/DL

## 2022-11-09 PROCEDURE — 99213 OFFICE O/P EST LOW 20 MIN: CPT | Performed by: OBSTETRICS & GYNECOLOGY

## 2022-11-09 PROCEDURE — 76819 FETAL BIOPHYS PROFIL W/O NST: CPT | Performed by: OBSTETRICS & GYNECOLOGY

## 2022-11-09 NOTE — PROGRESS NOTES
BPP 8 out of 8, cervix 1 cm 40% -2, signs symptoms labor reviewed, continue baby aspirin prenatal vitamins.  BPP each week

## 2022-11-14 ENCOUNTER — ANESTHESIA EVENT (OUTPATIENT)
Dept: OBSTETRICS AND GYNECOLOGY | Facility: HOSPITAL | Age: 37
End: 2022-11-14

## 2022-11-14 ENCOUNTER — HOSPITAL ENCOUNTER (INPATIENT)
Facility: HOSPITAL | Age: 37
LOS: 2 days | Discharge: HOME | End: 2022-11-16
Attending: OBSTETRICS & GYNECOLOGY | Admitting: OBSTETRICS & GYNECOLOGY

## 2022-11-14 ENCOUNTER — ANESTHESIA (OUTPATIENT)
Dept: OBSTETRICS AND GYNECOLOGY | Facility: HOSPITAL | Age: 37
End: 2022-11-14

## 2022-11-14 PROBLEM — O09.529 ADVANCED MATERNAL AGE IN MULTIGRAVIDA: Status: ACTIVE | Noted: 2022-11-14

## 2022-11-14 LAB
ABO GROUP BLD: NORMAL
ALBUMIN SERPL-MCNC: 2.8 G/DL (ref 3.5–5.2)
ALBUMIN/GLOB SERPL: 1.2 G/DL
ALP SERPL-CCNC: 77 U/L (ref 39–117)
ALT SERPL W P-5'-P-CCNC: 21 U/L (ref 1–33)
ANION GAP SERPL CALCULATED.3IONS-SCNC: 8.7 MMOL/L (ref 5–15)
AST SERPL-CCNC: 28 U/L (ref 1–32)
BASOPHILS # BLD AUTO: 0.01 10*3/MM3 (ref 0–0.2)
BASOPHILS # BLD MANUAL: 0.07 10*3/MM3 (ref 0–0.2)
BASOPHILS NFR BLD AUTO: 0.1 % (ref 0–1.5)
BASOPHILS NFR BLD MANUAL: 1 % (ref 0–1.5)
BILIRUB SERPL-MCNC: 0.2 MG/DL (ref 0–1.2)
BLD GP AB SCN SERPL QL: NEGATIVE
BUN SERPL-MCNC: 7 MG/DL (ref 6–20)
BUN/CREAT SERPL: 13.5 (ref 7–25)
CALCIUM SPEC-SCNC: 8.3 MG/DL (ref 8.6–10.5)
CHLORIDE SERPL-SCNC: 103 MMOL/L (ref 98–107)
CO2 SERPL-SCNC: 21.3 MMOL/L (ref 22–29)
CREAT SERPL-MCNC: 0.52 MG/DL (ref 0.57–1)
DEPRECATED RDW RBC AUTO: 45.5 FL (ref 37–54)
EGFRCR SERPLBLD CKD-EPI 2021: 122.9 ML/MIN/1.73
EOSINOPHIL # BLD AUTO: 0 10*3/MM3 (ref 0–0.4)
EOSINOPHIL NFR BLD AUTO: 0 % (ref 0.3–6.2)
ERYTHROCYTE [DISTWIDTH] IN BLOOD BY AUTOMATED COUNT: 13.5 % (ref 12.3–15.4)
GLOBULIN UR ELPH-MCNC: 2.4 GM/DL
GLUCOSE SERPL-MCNC: 127 MG/DL (ref 65–99)
HCT VFR BLD AUTO: 31.2 % (ref 34–46.6)
HGB BLD-MCNC: 10.6 G/DL (ref 12–15.9)
IMM GRANULOCYTES # BLD AUTO: 0.04 10*3/MM3 (ref 0–0.05)
IMM GRANULOCYTES NFR BLD AUTO: 0.3 % (ref 0–0.5)
LYMPHOCYTES # BLD AUTO: 0.45 10*3/MM3 (ref 0.7–3.1)
LYMPHOCYTES # BLD MANUAL: 0.92 10*3/MM3 (ref 0.7–3.1)
LYMPHOCYTES NFR BLD AUTO: 3.9 % (ref 19.6–45.3)
LYMPHOCYTES NFR BLD MANUAL: 3.1 % (ref 5–12)
MCH RBC QN AUTO: 31.4 PG (ref 26.6–33)
MCHC RBC AUTO-ENTMCNC: 34 G/DL (ref 31.5–35.7)
MCV RBC AUTO: 92.3 FL (ref 79–97)
MONOCYTES # BLD AUTO: 0.41 10*3/MM3 (ref 0.1–0.9)
MONOCYTES # BLD: 0.23 10*3/MM3 (ref 0.1–0.9)
MONOCYTES NFR BLD AUTO: 3.5 % (ref 5–12)
NEUTROPHILS # BLD AUTO: 6.14 10*3/MM3 (ref 1.7–7)
NEUTROPHILS NFR BLD AUTO: 10.67 10*3/MM3 (ref 1.7–7)
NEUTROPHILS NFR BLD AUTO: 92.2 % (ref 42.7–76)
NEUTROPHILS NFR BLD MANUAL: 83.3 % (ref 42.7–76)
NRBC BLD AUTO-RTO: 0 /100 WBC (ref 0–0.2)
PLAT MORPH BLD: NORMAL
PLATELET # BLD AUTO: 123 10*3/MM3 (ref 140–450)
PMV BLD AUTO: 9.5 FL (ref 6–12)
POTASSIUM SERPL-SCNC: 3.7 MMOL/L (ref 3.5–5.2)
PROT SERPL-MCNC: 5.2 G/DL (ref 6–8.5)
RBC # BLD AUTO: 3.38 10*6/MM3 (ref 3.77–5.28)
RBC MORPH BLD: NORMAL
RH BLD: POSITIVE
SODIUM SERPL-SCNC: 133 MMOL/L (ref 136–145)
T&S EXPIRATION DATE: NORMAL
VARIANT LYMPHS NFR BLD MANUAL: 12.5 % (ref 19.6–45.3)
WBC MORPH BLD: NORMAL
WBC NRBC COR # BLD: 11.58 10*3/MM3 (ref 3.4–10.8)

## 2022-11-14 PROCEDURE — 88307 TISSUE EXAM BY PATHOLOGIST: CPT

## 2022-11-14 PROCEDURE — 85007 BL SMEAR W/DIFF WBC COUNT: CPT | Performed by: OBSTETRICS & GYNECOLOGY

## 2022-11-14 PROCEDURE — 99202 OFFICE O/P NEW SF 15 MIN: CPT | Performed by: OBSTETRICS & GYNECOLOGY

## 2022-11-14 PROCEDURE — 86900 BLOOD TYPING SEROLOGIC ABO: CPT | Performed by: OBSTETRICS & GYNECOLOGY

## 2022-11-14 PROCEDURE — 86901 BLOOD TYPING SEROLOGIC RH(D): CPT | Performed by: OBSTETRICS & GYNECOLOGY

## 2022-11-14 PROCEDURE — 80053 COMPREHEN METABOLIC PANEL: CPT | Performed by: OBSTETRICS & GYNECOLOGY

## 2022-11-14 PROCEDURE — 0HQ9XZZ REPAIR PERINEUM SKIN, EXTERNAL APPROACH: ICD-10-PCS | Performed by: OBSTETRICS & GYNECOLOGY

## 2022-11-14 PROCEDURE — 86850 RBC ANTIBODY SCREEN: CPT | Performed by: OBSTETRICS & GYNECOLOGY

## 2022-11-14 PROCEDURE — 59409 OBSTETRICAL CARE: CPT | Performed by: OBSTETRICS & GYNECOLOGY

## 2022-11-14 PROCEDURE — 25010000002 BUTORPHANOL PER 1 MG

## 2022-11-14 PROCEDURE — 85025 COMPLETE CBC W/AUTO DIFF WBC: CPT | Performed by: OBSTETRICS & GYNECOLOGY

## 2022-11-14 RX ORDER — MORPHINE SULFATE 2 MG/ML
1 INJECTION, SOLUTION INTRAMUSCULAR; INTRAVENOUS EVERY 4 HOURS PRN
Status: DISCONTINUED | OUTPATIENT
Start: 2022-11-14 | End: 2022-11-16 | Stop reason: HOSPADM

## 2022-11-14 RX ORDER — OXYCODONE HYDROCHLORIDE AND ACETAMINOPHEN 5; 325 MG/1; MG/1
1 TABLET ORAL EVERY 4 HOURS PRN
Status: DISCONTINUED | OUTPATIENT
Start: 2022-11-14 | End: 2022-11-16 | Stop reason: HOSPADM

## 2022-11-14 RX ORDER — OXYTOCIN/0.9 % SODIUM CHLORIDE 30/500 ML
999 PLASTIC BAG, INJECTION (ML) INTRAVENOUS ONCE
Status: DISCONTINUED | OUTPATIENT
Start: 2022-11-14 | End: 2022-11-14 | Stop reason: HOSPADM

## 2022-11-14 RX ORDER — SODIUM CHLORIDE 0.9 % (FLUSH) 0.9 %
10 SYRINGE (ML) INJECTION AS NEEDED
Status: DISCONTINUED | OUTPATIENT
Start: 2022-11-14 | End: 2022-11-14 | Stop reason: HOSPADM

## 2022-11-14 RX ORDER — CARBOPROST TROMETHAMINE 250 UG/ML
250 INJECTION, SOLUTION INTRAMUSCULAR
Status: DISCONTINUED | OUTPATIENT
Start: 2022-11-14 | End: 2022-11-14 | Stop reason: HOSPADM

## 2022-11-14 RX ORDER — BUTORPHANOL TARTRATE 1 MG/ML
INJECTION, SOLUTION INTRAMUSCULAR; INTRAVENOUS
Status: COMPLETED
Start: 2022-11-14 | End: 2022-11-14

## 2022-11-14 RX ORDER — DIPHENHYDRAMINE HYDROCHLORIDE 50 MG/ML
12.5 INJECTION INTRAMUSCULAR; INTRAVENOUS EVERY 8 HOURS PRN
Status: DISCONTINUED | OUTPATIENT
Start: 2022-11-14 | End: 2022-11-14 | Stop reason: HOSPADM

## 2022-11-14 RX ORDER — TEMAZEPAM 7.5 MG/1
7.5 CAPSULE ORAL NIGHTLY PRN
Status: DISCONTINUED | OUTPATIENT
Start: 2022-11-14 | End: 2022-11-16 | Stop reason: HOSPADM

## 2022-11-14 RX ORDER — FAMOTIDINE 20 MG/1
20 TABLET, FILM COATED ORAL 2 TIMES DAILY PRN
Status: DISCONTINUED | OUTPATIENT
Start: 2022-11-14 | End: 2022-11-14 | Stop reason: HOSPADM

## 2022-11-14 RX ORDER — ONDANSETRON 4 MG/1
4 TABLET, FILM COATED ORAL EVERY 6 HOURS PRN
Status: DISCONTINUED | OUTPATIENT
Start: 2022-11-14 | End: 2022-11-14

## 2022-11-14 RX ORDER — SODIUM CHLORIDE 0.9 % (FLUSH) 0.9 %
1-10 SYRINGE (ML) INJECTION AS NEEDED
Status: DISCONTINUED | OUTPATIENT
Start: 2022-11-14 | End: 2022-11-16 | Stop reason: HOSPADM

## 2022-11-14 RX ORDER — DOCUSATE SODIUM 100 MG/1
100 CAPSULE, LIQUID FILLED ORAL 2 TIMES DAILY
Status: DISCONTINUED | OUTPATIENT
Start: 2022-11-14 | End: 2022-11-16 | Stop reason: HOSPADM

## 2022-11-14 RX ORDER — IBUPROFEN 600 MG/1
600 TABLET ORAL EVERY 6 HOURS PRN
Status: DISCONTINUED | OUTPATIENT
Start: 2022-11-14 | End: 2022-11-14 | Stop reason: HOSPADM

## 2022-11-14 RX ORDER — OXYCODONE AND ACETAMINOPHEN 7.5; 325 MG/1; MG/1
1 TABLET ORAL EVERY 4 HOURS PRN
Status: DISCONTINUED | OUTPATIENT
Start: 2022-11-14 | End: 2022-11-16 | Stop reason: HOSPADM

## 2022-11-14 RX ORDER — TRANEXAMIC ACID 10 MG/ML
1000 INJECTION, SOLUTION INTRAVENOUS ONCE AS NEEDED
Status: DISCONTINUED | OUTPATIENT
Start: 2022-11-14 | End: 2022-11-16 | Stop reason: HOSPADM

## 2022-11-14 RX ORDER — LIDOCAINE HYDROCHLORIDE 10 MG/ML
5 INJECTION, SOLUTION EPIDURAL; INFILTRATION; INTRACAUDAL; PERINEURAL AS NEEDED
Status: DISCONTINUED | OUTPATIENT
Start: 2022-11-14 | End: 2022-11-14 | Stop reason: HOSPADM

## 2022-11-14 RX ORDER — ONDANSETRON 2 MG/ML
4 INJECTION INTRAMUSCULAR; INTRAVENOUS EVERY 6 HOURS PRN
Status: DISCONTINUED | OUTPATIENT
Start: 2022-11-14 | End: 2022-11-14

## 2022-11-14 RX ORDER — TRISODIUM CITRATE DIHYDRATE AND CITRIC ACID MONOHYDRATE 500; 334 MG/5ML; MG/5ML
30 SOLUTION ORAL ONCE AS NEEDED
Status: DISCONTINUED | OUTPATIENT
Start: 2022-11-14 | End: 2022-11-14 | Stop reason: HOSPADM

## 2022-11-14 RX ORDER — METHYLERGONOVINE MALEATE 0.2 MG/ML
200 INJECTION INTRAVENOUS ONCE AS NEEDED
Status: DISCONTINUED | OUTPATIENT
Start: 2022-11-14 | End: 2022-11-14 | Stop reason: HOSPADM

## 2022-11-14 RX ORDER — PROMETHAZINE HYDROCHLORIDE 12.5 MG/1
12.5 TABLET ORAL EVERY 4 HOURS PRN
Status: DISCONTINUED | OUTPATIENT
Start: 2022-11-14 | End: 2022-11-16 | Stop reason: HOSPADM

## 2022-11-14 RX ORDER — FENTANYL CIT 0.2 MG/100ML-ROPIV 0.2%-NACL 0.9% EPIDURAL INJ 2/0.2 MCG/ML-%
10 SOLUTION INJECTION CONTINUOUS
Status: DISCONTINUED | OUTPATIENT
Start: 2022-11-14 | End: 2022-11-14

## 2022-11-14 RX ORDER — OXYTOCIN/0.9 % SODIUM CHLORIDE 30/500 ML
250 PLASTIC BAG, INJECTION (ML) INTRAVENOUS CONTINUOUS
Status: ACTIVE | OUTPATIENT
Start: 2022-11-14 | End: 2022-11-14

## 2022-11-14 RX ORDER — SODIUM CHLORIDE, SODIUM LACTATE, POTASSIUM CHLORIDE, CALCIUM CHLORIDE 600; 310; 30; 20 MG/100ML; MG/100ML; MG/100ML; MG/100ML
125 INJECTION, SOLUTION INTRAVENOUS CONTINUOUS
Status: DISCONTINUED | OUTPATIENT
Start: 2022-11-14 | End: 2022-11-14

## 2022-11-14 RX ORDER — NALOXONE HCL 0.4 MG/ML
0.4 VIAL (ML) INJECTION
Status: DISCONTINUED | OUTPATIENT
Start: 2022-11-14 | End: 2022-11-16 | Stop reason: HOSPADM

## 2022-11-14 RX ORDER — BUTORPHANOL TARTRATE 1 MG/ML
1 INJECTION, SOLUTION INTRAMUSCULAR; INTRAVENOUS EVERY 4 HOURS PRN
Status: DISCONTINUED | OUTPATIENT
Start: 2022-11-14 | End: 2022-11-14

## 2022-11-14 RX ORDER — HYDROCODONE BITARTRATE AND ACETAMINOPHEN 5; 325 MG/1; MG/1
1 TABLET ORAL EVERY 4 HOURS PRN
Status: DISCONTINUED | OUTPATIENT
Start: 2022-11-14 | End: 2022-11-14 | Stop reason: HOSPADM

## 2022-11-14 RX ORDER — MISOPROSTOL 200 UG/1
800 TABLET ORAL ONCE AS NEEDED
Status: COMPLETED | OUTPATIENT
Start: 2022-11-14 | End: 2022-11-14

## 2022-11-14 RX ORDER — PHYTONADIONE 1 MG/.5ML
INJECTION, EMULSION INTRAMUSCULAR; INTRAVENOUS; SUBCUTANEOUS
Status: DISPENSED
Start: 2022-11-14 | End: 2022-11-14

## 2022-11-14 RX ORDER — SODIUM CHLORIDE 0.9 % (FLUSH) 0.9 %
3 SYRINGE (ML) INJECTION EVERY 12 HOURS SCHEDULED
Status: DISCONTINUED | OUTPATIENT
Start: 2022-11-14 | End: 2022-11-14 | Stop reason: HOSPADM

## 2022-11-14 RX ORDER — HYDROCORTISONE 25 MG/G
1 CREAM TOPICAL AS NEEDED
Status: DISCONTINUED | OUTPATIENT
Start: 2022-11-14 | End: 2022-11-16 | Stop reason: HOSPADM

## 2022-11-14 RX ORDER — LIDOCAINE HYDROCHLORIDE 10 MG/ML
20 INJECTION, SOLUTION INFILTRATION; PERINEURAL ONCE
Status: COMPLETED | OUTPATIENT
Start: 2022-11-14 | End: 2022-11-14

## 2022-11-14 RX ORDER — MISOPROSTOL 200 UG/1
TABLET ORAL
Status: COMPLETED
Start: 2022-11-14 | End: 2022-11-14

## 2022-11-14 RX ORDER — ONDANSETRON 4 MG/1
4 TABLET, FILM COATED ORAL EVERY 6 HOURS PRN
Status: DISCONTINUED | OUTPATIENT
Start: 2022-11-14 | End: 2022-11-16 | Stop reason: HOSPADM

## 2022-11-14 RX ORDER — ACETAMINOPHEN 325 MG/1
650 TABLET ORAL EVERY 4 HOURS PRN
Status: DISCONTINUED | OUTPATIENT
Start: 2022-11-14 | End: 2022-11-14 | Stop reason: HOSPADM

## 2022-11-14 RX ORDER — ONDANSETRON 2 MG/ML
4 INJECTION INTRAMUSCULAR; INTRAVENOUS ONCE AS NEEDED
Status: DISCONTINUED | OUTPATIENT
Start: 2022-11-14 | End: 2022-11-14 | Stop reason: HOSPADM

## 2022-11-14 RX ORDER — IBUPROFEN 600 MG/1
600 TABLET ORAL EVERY 6 HOURS PRN
Status: DISCONTINUED | OUTPATIENT
Start: 2022-11-14 | End: 2022-11-16 | Stop reason: HOSPADM

## 2022-11-14 RX ORDER — METOCLOPRAMIDE 10 MG/1
10 TABLET ORAL ONCE
Status: DISCONTINUED | OUTPATIENT
Start: 2022-11-14 | End: 2022-11-16 | Stop reason: HOSPADM

## 2022-11-14 RX ORDER — NALOXONE HCL 0.4 MG/ML
0.1 VIAL (ML) INJECTION
Status: DISCONTINUED | OUTPATIENT
Start: 2022-11-14 | End: 2022-11-16 | Stop reason: HOSPADM

## 2022-11-14 RX ORDER — MAGNESIUM CARB/ALUMINUM HYDROX 105-160MG
30 TABLET,CHEWABLE ORAL ONCE AS NEEDED
Status: DISCONTINUED | OUTPATIENT
Start: 2022-11-14 | End: 2022-11-14 | Stop reason: HOSPADM

## 2022-11-14 RX ORDER — ERYTHROMYCIN 5 MG/G
OINTMENT OPHTHALMIC
Status: DISPENSED
Start: 2022-11-14 | End: 2022-11-14

## 2022-11-14 RX ORDER — ACETAMINOPHEN 325 MG/1
650 TABLET ORAL EVERY 4 HOURS PRN
Status: DISCONTINUED | OUTPATIENT
Start: 2022-11-14 | End: 2022-11-16 | Stop reason: HOSPADM

## 2022-11-14 RX ORDER — EPHEDRINE SULFATE 50 MG/ML
5 INJECTION, SOLUTION INTRAVENOUS
Status: DISCONTINUED | OUTPATIENT
Start: 2022-11-14 | End: 2022-11-14 | Stop reason: HOSPADM

## 2022-11-14 RX ORDER — ACETAMINOPHEN 325 MG/1
625 TABLET ORAL EVERY 4 HOURS PRN
Status: DISCONTINUED | OUTPATIENT
Start: 2022-11-14 | End: 2022-11-14 | Stop reason: HOSPADM

## 2022-11-14 RX ORDER — FAMOTIDINE 10 MG/ML
20 INJECTION, SOLUTION INTRAVENOUS 2 TIMES DAILY PRN
Status: DISCONTINUED | OUTPATIENT
Start: 2022-11-14 | End: 2022-11-14 | Stop reason: HOSPADM

## 2022-11-14 RX ORDER — BISACODYL 10 MG
10 SUPPOSITORY, RECTAL RECTAL DAILY PRN
Status: DISCONTINUED | OUTPATIENT
Start: 2022-11-15 | End: 2022-11-16 | Stop reason: HOSPADM

## 2022-11-14 RX ORDER — TERBUTALINE SULFATE 1 MG/ML
0.25 INJECTION, SOLUTION SUBCUTANEOUS AS NEEDED
Status: DISCONTINUED | OUTPATIENT
Start: 2022-11-14 | End: 2022-11-14 | Stop reason: HOSPADM

## 2022-11-14 RX ORDER — FAMOTIDINE 10 MG/ML
20 INJECTION, SOLUTION INTRAVENOUS ONCE AS NEEDED
Status: DISCONTINUED | OUTPATIENT
Start: 2022-11-14 | End: 2022-11-14 | Stop reason: HOSPADM

## 2022-11-14 RX ORDER — SODIUM CHLORIDE 0.9 % (FLUSH) 0.9 %
10 SYRINGE (ML) INJECTION EVERY 12 HOURS SCHEDULED
Status: DISCONTINUED | OUTPATIENT
Start: 2022-11-14 | End: 2022-11-14 | Stop reason: HOSPADM

## 2022-11-14 RX ORDER — MISOPROSTOL 200 UG/1
800 TABLET ORAL AS NEEDED
Status: DISCONTINUED | OUTPATIENT
Start: 2022-11-14 | End: 2022-11-16 | Stop reason: HOSPADM

## 2022-11-14 RX ORDER — ONDANSETRON 2 MG/ML
4 INJECTION INTRAMUSCULAR; INTRAVENOUS EVERY 6 HOURS PRN
Status: DISCONTINUED | OUTPATIENT
Start: 2022-11-14 | End: 2022-11-16 | Stop reason: HOSPADM

## 2022-11-14 RX ADMIN — MISOPROSTOL 800 MCG: 200 TABLET ORAL at 05:02

## 2022-11-14 RX ADMIN — IBUPROFEN 600 MG: 600 TABLET, FILM COATED ORAL at 07:20

## 2022-11-14 RX ADMIN — IBUPROFEN 600 MG: 600 TABLET, FILM COATED ORAL at 20:00

## 2022-11-14 RX ADMIN — BUTORPHANOL TARTRATE 1 MG: 1 INJECTION, SOLUTION INTRAMUSCULAR; INTRAVENOUS at 05:21

## 2022-11-14 RX ADMIN — OXYCODONE HYDROCHLORIDE AND ACETAMINOPHEN 1 TABLET: 7.5; 325 TABLET ORAL at 22:01

## 2022-11-14 RX ADMIN — IBUPROFEN 600 MG: 600 TABLET, FILM COATED ORAL at 14:15

## 2022-11-14 RX ADMIN — SODIUM CHLORIDE, POTASSIUM CHLORIDE, SODIUM LACTATE AND CALCIUM CHLORIDE 125 ML/HR: 600; 310; 30; 20 INJECTION, SOLUTION INTRAVENOUS at 04:47

## 2022-11-14 RX ADMIN — Medication 20 ML: at 05:15

## 2022-11-14 RX ADMIN — DOCUSATE SODIUM 100 MG: 100 CAPSULE, LIQUID FILLED ORAL at 20:00

## 2022-11-14 RX ADMIN — OXYCODONE HYDROCHLORIDE AND ACETAMINOPHEN 1 TABLET: 7.5; 325 TABLET ORAL at 11:23

## 2022-11-14 RX ADMIN — Medication: at 09:35

## 2022-11-14 NOTE — OBED NOTES
[facility]  Obstetric History and Physical    Chief Complaint   Patient presents with   • Laboring     SROM @ 0030.  Contractions started about the same time       Subjective     HPI:    Patient is a 37 y.o. female  currently at 38w3d, who presents with complaints of contractions and leaking fluid. +FM.     Her prenatal care is benign.  Her previous obstetric/gynecological history is noted for is non-contributory.    The following portions of the patients history were reviewed and updated as appropriate:   current medications, allergies, past medical history, past surgical history, past family history, past social history and current problem list.     Prenatal Information:  Prenatal Results     POC Urine Glucose/Protein     Test Value Reference Range Date Time    Urine Glucose  Negative mg/dL Negative 22 1213    Urine Protein  Negative mg/dL Negative 22 1213          Initial Prenatal Labs     Test Value Reference Range Date Time    Hemoglobin  12.6 g/dL 11.1 - 15.9 22 1002    Hematocrit  37.5 % 34.0 - 46.6 22 1002    Platelets  164 x10E3/uL 150 - 450 22 1002    Rubella IgG  2.18 index Immune >0.99 22 1002    Hepatitis B SAg  Negative  Negative 22 1002    Hepatitis C Ab  <0.1 s/co ratio 0.0 - 0.9 22 1002    RPR  Non Reactive  Non Reactive 22 1002    ABO  O   22 1002    Rh  Positive   22 1002    Antibody Screen  Negative  Negative 22 1002    HIV  Non Reactive  Non Reactive 22 1002    Urine Culture  Final report   22 1411    Gonorrhea        Chlamydia        TSH  0.659 uIU/mL 0.450 - 4.500 22 1002    HgB A1c               2nd and 3rd Trimester     Test Value Reference Range Date Time    Hemoglobin (repeated)  11.4 g/dL 12.0 - 15.9 22 1053    Hematocrit (repeated)  33.1 % 34.0 - 46.6 22 1053    Platelets   144 10*3/mm3 140 - 450 22 1053       164 x10E3/uL 150 - 450 22 1002    GCT  90 mg/dL 65 - 139  08/31/22 1053    Antibody Screen (repeated)        GTT Fasting        GTT 1 Hr        GTT 2 Hr        GTT 3 Hr        Group B Strep  Negative  Negative 11/03/22 1105          Drug Screening     Test Value Reference Range Date Time    Amphetamine Screen        Barbiturate Screen        Benzodiazepine Screen        Methadone Screen        Phencyclidine Screen        Opiates Screen        THC Screen        Cocaine Screen        Propoxyphene Screen        Buprenorphine Screen        Methamphetamine Screen        Oxycodone Screen        Tricyclic Antidepressants Screen              Other (Risk screening)     Test Value Reference Range Date Time    Varicella IgG        Parvovirus IgG        CMV IgG        Cystic Fibrosis        Hemoglobin electrophoresis        NIPT        MSAFP-4        AFP (for NTD only)              Legend    ^: Historical                      External Prenatal Results     Pregnancy Outside Results - Transcribed From Office Records - See Scanned Records For Details     Test Value Date Time    ABO  O  06/01/22 1002    Rh  Positive  06/01/22 1002    Antibody Screen  Negative  06/01/22 1002    Varicella IgG       Rubella  2.18 index 06/01/22 1002    Hgb  11.4 g/dL 08/31/22 1053       12.6 g/dL 06/01/22 1002    Hct  33.1 % 08/31/22 1053       37.5 % 06/01/22 1002    Glucose Fasting GTT       Glucose Tolerance Test 1 hour       Glucose Tolerance Test 3 hour       Gonorrhea (discrete)       Chlamydia (discrete)       RPR  Non Reactive  06/01/22 1002    VDRL       Syphilis Antibody       HBsAg  Negative  06/01/22 1002    Herpes Simplex Virus PCR       Herpes Simplex VIrus Culture       HIV  Non Reactive  06/01/22 1002    Hep C RNA Quant PCR       Hep C Antibody  <0.1 s/co ratio 06/01/22 1002    AFP       Group B Strep  Negative  11/03/22 1105    GBS Susceptibility to Clindamycin       GBS Susceptibility to Erythromycin       Fetal Fibronectin       Genetic Testing, Maternal Blood             Drug Screening      Test Value Date Time    Urine Drug Screen       Amphetamine Screen       Barbiturate Screen       Benzodiazepine Screen       Methadone Screen       Phencyclidine Screen       Opiates Screen       THC Screen       Cocaine Screen       Propoxyphene Screen       Buprenorphine Screen       Methamphetamine Screen       Oxycodone Screen       Tricyclic Antidepressants Screen             Legend    ^: Historical                         Past OB History:     OB History    Para Term  AB Living   5 4 4 0 0 4   SAB IAB Ectopic Molar Multiple Live Births   0 0 0 0 0 4      # Outcome Date GA Lbr Woody/2nd Weight Sex Delivery Anes PTL Lv   5 Current            4 Term 04/10/21 39w1d 00:15 / 00:06 3635 g (8 lb 0.2 oz) M Vag-Spont None N MAHI      Birth Comments: scale 4      Name: CHAZ MG      Apgar1: 8  Apgar5: 9   3 Term    3232 g (7 lb 2 oz) F Vaginal unsp   MAHI   2 Term    3714 g (8 lb 3 oz) M Vaginal unsp   MAHI   1 Term    3487 g (7 lb 11 oz) F Vaginal unsp   MAHI      Obstetric Comments   BOBBI NUNES, AND VENKATA        Past Medical History: No past medical history on file.   Past Surgical History Past Surgical History:   Procedure Laterality Date   • HEMORRHOIDECTOMY     • WISDOM TOOTH EXTRACTION        Family History: Family History   Problem Relation Age of Onset   • Colon cancer Paternal Grandmother    • No Known Problems Daughter    • No Known Problems Daughter    • No Known Problems Son       Social History:  reports that she has never smoked. She has never used smokeless tobacco.   reports that she does not currently use alcohol.   reports no history of drug use.        General ROS:     Review of Systems    Objective       Vital Signs Range for the last 24 hours  Temperature:     Temp Source:     BP:     Pulse:     Respirations:     SPO2:       Physical Examination:     General: well developed; well nourished  no acute distress   Abdomen: gravid   FHT's: Reactive with variable  decelerations, cat 2      Cervix: was checked (by RN): 3 cm / 80 % / -3   Presentation: cephalic-not confirmed   Contractions: irregular - external monitors used   Lower Extremities: Edema is present bilateral      Presentation: vertex   Cervix: Method: sterile exam per RN   Dilation: Cervical Dilation (cm): 3-4   Effacement: Cervical Effacement: 80-90%   Station:         Fetal Heart Rate Assessment   Method:ext     Beats/min:     Baseline:140     Variability:moderate     Accels:+     Decels:variable     Tracing Category:2       Uterine Assessment   Method:     Frequency (min):     Ctx Count in 10 min:     Duration:     Intensity:     [place] Units:       GBS is negative      Assessment & Plan       Pregnancy        Assessment:  1.  Intrauterine pregnancy at 38w3d gestation with reactive, variable decelerations present fetal status.    2.  labor  with ROM  3.  Obstetrical history significant for is non-contributory.  4.  GBS status:   Strep Gp B DC   Date Value Ref Range Status   2022 Negative Negative Final     Comment:     Centers for Disease Control and Prevention (CDC) and American Congress  of Obstetricians and Gynecologists (ACOG) guidelines for prevention of   group B streptococcal (GBS) disease specify co-collection of  a vaginal and rectal swab specimen to maximize sensitivity of GBS  detection. Per the CDC and ACOG, swabbing both the lower vagina and  rectum substantially increases the yield of detection compared with  sampling the vagina alone.  Penicillin G, ampicillin, or cefazolin are indicated for intrapartum  prophylaxis of  GBS colonization. Reflex susceptibility  testing should be performed prior to use of clindamycin only on GBS  isolates from penicillin-allergic women who are considered a high risk  for anaphylaxis. Treatment with vancomycin without additional testing  is warranted if resistance to clindamycin is noted.         Plan:  1. Admit/anticipate   2.  Discussed with Dr. Mead        Electronically signed by Melania Gutierrez MD, 11/14/22, 3:43 AM EST.

## 2022-11-14 NOTE — H&P
History and physical    Admission date 2022     Patient: Adrienne Lezama MRN: [id number]   YOB: 1985 Age: 37 y.o. Sex: female     Chief Complaint   Patient presents with   • Laboring     SROM @ 0030.  Contractions started about the same time       HPI:    Adrienne Lezama is a 37 y.o.,  AT 38w3d admitted for evaluation of labor.  Patient was apparently seen in ED found to have rupture membranes.  She was then sent for admission to regular labor and delivery and I received a call notifying me of her admission at the same time she was actively delivering her baby.  Patient is accompanied by her  and Dula.  Apparently they labored at home for a while.  As I arrived placenta had just delivered and patient appeared stable    Patient Active Problem List   Diagnosis   • History of 2019 novel coronavirus disease (COVID-19)   • Grade II hemorrhoids   • Multigravida of advanced maternal age in third trimester   • Maternal anemia in pregnancy, antepartum   • Pregnancy   • Nuchal cord affecting delivery   • Precipitous delivery   • Advanced maternal age in multigravida     OB History    Para Term  AB Living   5 5 5 0 0 5   SAB IAB Ectopic Molar Multiple Live Births   0 0 0 0 0 5      # Outcome Date GA Lbr Woody/2nd Weight Sex Delivery Anes PTL Lv   5 Term 22 38w3d 04:10 / 00:01 3460 g (7 lb 10.1 oz) M Vag-Spont None N MAHI      Birth Comments: Infant scale #4      Complications: Precipitant delivery   4 Term 04/10/21 39w1d 00:15 / 00:06 3635 g (8 lb 0.2 oz) M Vag-Spont None N MAHI      Birth Comments: scale 4   3 Term    3232 g (7 lb 2 oz) F Vaginal unsp   MAHI   2 Term    3714 g (8 lb 3 oz) M Vaginal unsp   MAHI   1 Term    3487 g (7 lb 11 oz) F Vaginal unsp   MAHI      Obstetric Comments   BOBBI NUNES, AND VENKATA      History reviewed. No pertinent past medical history.  Past Surgical History:   Procedure Laterality Date   • HEMORRHOIDECTOMY     • WISDOM TOOTH  EXTRACTION       No current facility-administered medications on file prior to encounter.     Current Outpatient Medications on File Prior to Encounter   Medication Sig Dispense Refill   • aspirin (aspirin) 81 MG EC tablet Take 1 tablet by mouth Daily. 90 tablet 2   • ferrous gluconate (FERGON) 324 MG tablet Take 324 mg by mouth Daily With Breakfast.     • prenatal vitamin (prenatal, CLASSIC, vitamin) tablet Take  by mouth Daily.         ROS:      Except as outlined in history of physical illness, patient denies any changes in her GYN, , GI systems. All other systems reviewed are negative.      OBJECTIVE:     Vitals:   Vitals:    11/14/22 0700 11/14/22 0736 11/14/22 0739 11/14/22 0850   BP: 104/63 (!) 71/50 92/67 111/64   BP Location:   Right arm Right arm   Patient Position:   Lying Lying   Pulse: 90 93 84 80   Resp:   18 18   Temp:    97.5 °F (36.4 °C)   TempSrc:    Oral   SpO2: 100%      Weight:       Height:             Appearance/Psychiatric: In no distress   Constitutional: The patient is well nourished   Cardiovascular: She does not have edema. Heart RRR  Respiratory: Respiratory effort is normal. CTAB   Abdomen: Soft, gravid.  Ext: nontender, no edema. +2/4 bilateral patellar reflexes   Cx; was intact without any tears or lacerations but there were multiple clots.  Vagina showed a first-degree midline scrape which was repaired with 3-0 Vicryl  Uterus was slow to contract as patient had declined Pitocin     LOS: 0 days    Basim Mead MD   November 14, 2022    Assessment and Plan:   Pregnancy [Z34.90]  Advanced maternal age in multigravida [O09.529]  Precipitous delivery  First-degree vaginal laceration  Uterine atony

## 2022-11-14 NOTE — PROGRESS NOTES
[facility]  Vaginal Delivery Note    Delivery     Delivery:      YOB: 2022    Time of Birth:  Gestational Age 4:41 AM   38w3d     Anesthesia:      Delivering clinician:  none   Forceps?   No   Vacuum? No    Shoulder dystocia present: No        Delivery narrative:  I was called to the room as the fetus was being delivered by RN upon my arrival.  The patient underwent a spontaneous vaginal delivery of a viable male  at 0441 hrs.  The infant was bulb suctioned. Nuchal cord x 1, reduced. The cord was clamped x2 and cut.  The placenta was delivered spontaneously and intact.  The weight was 3460 g and the Apgars were 6 and 8.   The vaginal and cervical exams were within normal limits.  The infant was in the warmer and mom was in recovery in stable and satisfactory condition.  The sponge counts and instrument counts were verified as correct.    Infant    Findings: male  infant     Infant observations: Weight: No birth weight on file.   Length:   in  Observations/Comments:        Apgars:   @ 1 minute /6      @ 5 minutes/8         Placenta, Cord, and Fluid    Placenta delivered    at     0442   Cord:   present.   Nuchal Cord?  yes; Number of nuchal loops present: 1     Cord blood obtained:     Cord gases obtained:      Cord gas results: Venous:  No results found for: PHCVEN    Arterial:  No results found for: PHCART     Repair    Episiotomy: Not recorded     No    Lacerations: No   Estimated Blood Loss:           Complications  none    Disposition  Mother to Mother Baby/Postpartum  in stable condition currently.  Baby to remains with mom in stable condition currently.    Electronically signed by Melania Gutierrez MD, 22, 5:04 AM EST.

## 2022-11-14 NOTE — ANESTHESIA PREPROCEDURE EVALUATION
Anesthesia Evaluation     Patient summary reviewed and Nursing notes reviewed   no history of anesthetic complications:  NPO Solid Status: > 6 hours  NPO Liquid Status: > 6 hours           Airway   Mallampati: II  TM distance: >3 FB  Neck ROM: full  no difficulty expected and No difficulty expected  Dental - normal exam     Pulmonary - negative pulmonary ROS and normal exam    breath sounds clear to auscultation  (-) rhonchi, decreased breath sounds, wheezes, rales, stridor  Cardiovascular - negative cardio ROS and normal exam    NYHA Classification: I  Rhythm: regular  Rate: normal    (-) murmur, weak pulses, friction rub, systolic click, carotid bruits, JVD, peripheral edema      Neuro/Psych- negative ROS  GI/Hepatic/Renal/Endo - negative ROS     Musculoskeletal (-) negative ROS    Abdominal  - normal exam    Abdomen: soft.   Substance History - negative use     OB/GYN    (+) Pregnant,         Other   blood dyscrasia anemia,                     Anesthesia Plan    ASA 2     epidural     intravenous induction     Anesthetic plan, risks, benefits, and alternatives have been provided, discussed and informed consent has been obtained with: patient.        CODE STATUS:    Level Of Support Discussed With: Patient  Code Status (Patient has no pulse and is not breathing): CPR (Attempt to Resuscitate)  Medical Interventions (Patient has pulse or is breathing): Full Support

## 2022-11-14 NOTE — PLAN OF CARE
Problem: Adult Inpatient Plan of Care  Goal: Plan of Care Review  Outcome: Ongoing, Progressing  Flowsheets (Taken 11/14/2022 1655)  Progress: improving  Plan of Care Reviewed With: patient  Outcome Evaluation: VSS. Bleeding has been light, fundus firm @ U. Deviated to R of midline since arrival to floor with no issues. Pain controlled with motrin and PRN narcotic. Ambulating well, voiding independently. Breastfeeding going well. No N/V, tolerating food and drink. Bonding appropratiely with infant.  Goal: Patient-Specific Goal (Individualized)  Outcome: Ongoing, Progressing  Goal: Absence of Hospital-Acquired Illness or Injury  Outcome: Ongoing, Progressing  Intervention: Identify and Manage Fall Risk  Recent Flowsheet Documentation  Taken 11/14/2022 1600 by Granstaff, Diana, RN  Safety Promotion/Fall Prevention: safety round/check completed  Taken 11/14/2022 1415 by Diana Mueller RN  Safety Promotion/Fall Prevention: safety round/check completed  Taken 11/14/2022 1223 by Diana Mueller RN  Safety Promotion/Fall Prevention: safety round/check completed  Taken 11/14/2022 1040 by Diana Mueller RN  Safety Promotion/Fall Prevention: safety round/check completed  Taken 11/14/2022 0920 by Diana Mueller RN  Safety Promotion/Fall Prevention: safety round/check completed  Taken 11/14/2022 0850 by Diana Mueller RN  Safety Promotion/Fall Prevention: safety round/check completed  Intervention: Prevent Skin Injury  Recent Flowsheet Documentation  Taken 11/14/2022 1600 by Diana Mueller RN  Body Position: position changed independently  Taken 11/14/2022 1223 by Diana Mueller RN  Body Position: position changed independently  Taken 11/14/2022 1040 by Diana Mueller RN  Body Position: position changed independently  Taken 11/14/2022 0920 by Diana Mueller RN  Body Position: position changed independently  Taken 11/14/2022 0850 by Diana Mueller RN  Body Position:  supine  Intervention: Prevent and Manage VTE (Venous Thromboembolism) Risk  Recent Flowsheet Documentation  Taken 11/14/2022 1600 by Diana Mueller RN  Activity Management:   up ad sharyn   activity encouraged   ambulated to bathroom  Taken 11/14/2022 1415 by Diana Mueller RN  Activity Management:   up ad sharyn   activity encouraged  Taken 11/14/2022 1223 by Diana Mueller RN  Activity Management: ambulated to bathroom  Taken 11/14/2022 1040 by Diana Mueller RN  Activity Management: activity adjusted per tolerance  Taken 11/14/2022 0920 by Diana Mueller RN  Activity Management: (pivoted to bedside commode, pt dizzy)   activity encouraged   other (see comments)  Taken 11/14/2022 0850 by Diana Mueller RN  Activity Management:   activity adjusted per tolerance   bedrest  Goal: Optimal Comfort and Wellbeing  Outcome: Ongoing, Progressing  Intervention: Monitor Pain and Promote Comfort  Recent Flowsheet Documentation  Taken 11/14/2022 1415 by Diana Mueller RN  Pain Management Interventions: see MAR  Taken 11/14/2022 1223 by Diana Mueller RN  Pain Management Interventions:   pain management plan reviewed with patient/caregiver   cold applied  Intervention: Provide Person-Centered Care  Recent Flowsheet Documentation  Taken 11/14/2022 0920 by Diana Mueller RN  Trust Relationship/Rapport:   care explained   choices provided  Goal: Readiness for Transition of Care  Outcome: Ongoing, Progressing     Problem: Adjustment to Role Transition (Postpartum Vaginal Delivery)  Goal: Successful Maternal Role Transition  Outcome: Ongoing, Progressing     Problem: Bleeding (Postpartum Vaginal Delivery)  Goal: Hemostasis  Outcome: Ongoing, Progressing     Problem: Infection (Postpartum Vaginal Delivery)  Goal: Absence of Infection Signs/Symptoms  Outcome: Ongoing, Progressing  Intervention: Prevent or Manage Infection  Recent Flowsheet Documentation  Taken 11/14/2022 1600 by  Diana Mueller, RN  Perineal Care:   perineum cleansed   absorbent pad changed  Taken 11/14/2022 1223 by Diana Mueller RN  Perineal Care:   absorbent pad changed   perineum cleansed  Taken 11/14/2022 0920 by Diana Mueller RN  Perineal Care:   absorbent pad changed   perineum cleansed     Problem: Pain (Postpartum Vaginal Delivery)  Goal: Acceptable Pain Control  Outcome: Ongoing, Progressing  Intervention: Prevent or Manage Pain  Recent Flowsheet Documentation  Taken 11/14/2022 1415 by Diana Mueller RN  Pain Management Interventions: see MAR  Taken 11/14/2022 1223 by Diana Mueller RN  Pain Management Interventions:   pain management plan reviewed with patient/caregiver   cold applied     Problem: Urinary Retention (Postpartum Vaginal Delivery)  Goal: Effective Urinary Elimination  Outcome: Ongoing, Progressing   Goal Outcome Evaluation:  Plan of Care Reviewed With: patient        Progress: improving  Outcome Evaluation: VSS. Bleeding has been light, fundus firm @ U. Deviated to R of midline since arrival to floor with no issues. Pain controlled with motrin and PRN narcotic. Ambulating well, voiding independently. Breastfeeding going well. No N/V, tolerating food and drink. Bonding appropratiely with infant.

## 2022-11-14 NOTE — L&D DELIVERY NOTE
[facility]  Vaginal Delivery Note    Delivery     Delivery: Vaginal, Spontaneous    Active Hospital Problems    Diagnosis  POA   • **Pregnancy [Z34.90]  Not Applicable   • Nuchal cord affecting delivery [O69.81X0]  Unknown   • Precipitous delivery [O62.3]  Unknown   • Advanced maternal age in multigravida [O09.529]  Yes      YOB: 2022    Time of Birth: 4:41 AM      Anesthesia: epidural    Delivering clinician: MARISSA Mead    Pre-op dg advanced maternal age, spontaneous rupture membranes, active labor  Post-op precipitous delivery, uterine atony, nuchal cord,                  Delivery narrative: Patient had spontaneous rupture membranes at home, she decided to labor for a while at home rather than arrived to the hospital as previously discussed.  She was seen initially in ED and confirmed to have rupture membranes thought to be 3 to 4 cm.  She was admitted.  Apparently contractions picked up quickly, I was notified of her inpatient admission by telephone as the patient was actively delivering.  Patient was said to have spontaneous delivered her male infant with nursing staff in attendance.  There was described a loose nuchal cord.  When I arrived infant was vigorous moving all 4 extremities well.  Placenta had just spontaneously delivered.              Infant was being assessed by nursery.  Patient had her  and Dula accompanying her.  She declined administration of Pitocin.  Despite fundal massage she continued to have some bleeding.  Skin to skin contact between the mother and infant was underway.  There was a first-degree midline scrape that was oozing as well.  Some local anesthetic was administered to this area and that midline scrape was closed with 3-0 Vicryl in a figure-of-eight fashion.  There was no more bleeding from that area but she continued to have uterine atony.  Bimanual massage was performed.  Cytotec was administered.  With gentle bimanual massage and a little time the uterus  slowly contracted.  We watched patient and observe for any further bleeding for about 15 minutes.  Uterus was contracted nicely at this time.  No further bleeding was seen               all counts were correct      Infant    Findings: male  infant  38w3d    Weight: No birth weight on file.   Length:   in  Observations/Comments:          @ 1 minute /     @ 5 minutes      Infant observations:            Infant Name:      Placenta, Cord, and Fluid    Placenta delivered  Spontaneous  at  11/14/2022  4:48 AM     Cord: 3 vessels  present.   Nuchal Cord?  yes; Number of nuchal loops present:      Cord blood obtained: yes                 Repair    Episiotomy: No   Lacerations:  Laceration first-degree midline 1/2 inch   Estimated Blood Loss: See EMR for quantitative blood loss          Complications:                    None          Disposition  Mother and baby were stable and doing well.    Basim Mead MD  11/14/22  05:45 EST

## 2022-11-15 LAB
BASOPHILS # BLD AUTO: 0.02 10*3/MM3 (ref 0–0.2)
BASOPHILS NFR BLD AUTO: 0.3 % (ref 0–1.5)
DEPRECATED RDW RBC AUTO: 43.2 FL (ref 37–54)
EOSINOPHIL # BLD AUTO: 0.1 10*3/MM3 (ref 0–0.4)
EOSINOPHIL NFR BLD AUTO: 1.4 % (ref 0.3–6.2)
ERYTHROCYTE [DISTWIDTH] IN BLOOD BY AUTOMATED COUNT: 13 % (ref 12.3–15.4)
HCT VFR BLD AUTO: 27.6 % (ref 34–46.6)
HGB BLD-MCNC: 9.4 G/DL (ref 12–15.9)
IMM GRANULOCYTES # BLD AUTO: 0.05 10*3/MM3 (ref 0–0.05)
IMM GRANULOCYTES NFR BLD AUTO: 0.7 % (ref 0–0.5)
LYMPHOCYTES # BLD AUTO: 1.26 10*3/MM3 (ref 0.7–3.1)
LYMPHOCYTES NFR BLD AUTO: 17.6 % (ref 19.6–45.3)
MCH RBC QN AUTO: 31.3 PG (ref 26.6–33)
MCHC RBC AUTO-ENTMCNC: 34.1 G/DL (ref 31.5–35.7)
MCV RBC AUTO: 92 FL (ref 79–97)
MONOCYTES # BLD AUTO: 0.5 10*3/MM3 (ref 0.1–0.9)
MONOCYTES NFR BLD AUTO: 7 % (ref 5–12)
NEUTROPHILS NFR BLD AUTO: 5.21 10*3/MM3 (ref 1.7–7)
NEUTROPHILS NFR BLD AUTO: 73 % (ref 42.7–76)
NRBC BLD AUTO-RTO: 0.1 /100 WBC (ref 0–0.2)
PLATELET # BLD AUTO: 123 10*3/MM3 (ref 140–450)
PMV BLD AUTO: 10 FL (ref 6–12)
RBC # BLD AUTO: 3 10*6/MM3 (ref 3.77–5.28)
WBC NRBC COR # BLD: 7.14 10*3/MM3 (ref 3.4–10.8)

## 2022-11-15 PROCEDURE — 85025 COMPLETE CBC W/AUTO DIFF WBC: CPT | Performed by: OBSTETRICS & GYNECOLOGY

## 2022-11-15 PROCEDURE — 99232 SBSQ HOSP IP/OBS MODERATE 35: CPT | Performed by: OBSTETRICS & GYNECOLOGY

## 2022-11-15 RX ADMIN — DOCUSATE SODIUM 100 MG: 100 CAPSULE, LIQUID FILLED ORAL at 10:00

## 2022-11-15 RX ADMIN — IBUPROFEN 600 MG: 600 TABLET, FILM COATED ORAL at 10:00

## 2022-11-15 RX ADMIN — IBUPROFEN 600 MG: 600 TABLET, FILM COATED ORAL at 18:24

## 2022-11-15 NOTE — LACTATION NOTE
This note was copied from a baby's chart.  Informed Mom that [facility] denied her breast pump prescription.

## 2022-11-15 NOTE — LACTATION NOTE
This note was copied from a baby's chart.  P5 term baby nursing well so far per Mom. This is her fifth baby, she reports breast feeding going well with all of them. Discussed possibly pumping d/t PPH especially if baby is showing feeding cues and output decreases. Mom declines at present. Encouraged to call for any assistance or questions.

## 2022-11-15 NOTE — PROGRESS NOTES
"Postpartum Vaginal Delivery Note PPD#1    CC: PPD 1 s/p     Assessment:   Pt doing well. Pain well controlled. Lochia normal. Ambulating well. Tolerating regular diet. Voiding without difficulty. No complaints.  Desires infant circ- RBA discussed    Review of Systems - Negative except cramping    Vitals:   /67 (BP Location: Right arm, Patient Position: Sitting)   Pulse 85   Temp 97.9 °F (36.6 °C) (Oral)   Resp 18   Ht 167.6 cm (65.98\")   Wt 80.7 kg (178 lb)   LMP 2022 (Approximate)   SpO2 100%   Breastfeeding Yes   BMI 28.74 kg/m²         Exam:   Gen: NAD, cooperative, conversive  Neck:  No LAD or TM  Lungs: non labored breathing  Heart:  RRR  Abd: Soft, nondistended, fundus is firm below umbillicus, approp tender  Ext: Nontender bilaterally, trace edema bilateral    Labs:  Lab Results (last 24 hours)     Procedure Component Value Units Date/Time    CBC & Differential [[id number]]  (Abnormal) Collected: 11/15/22 0514    Specimen: Blood Updated: 11/15/22 0707    Narrative:      The following orders were created for panel order CBC & Differential.  Procedure                               Abnormality         Status                     ---------                               -----------         ------                     CBC Auto Differential[[id number]]        Abnormal            Final result                 Please view results for these tests on the individual orders.    CBC Auto Differential [[id number]]  (Abnormal) Collected: 11/15/22 0514    Specimen: Blood Updated: 11/15/22 0707     WBC 7.14 10*3/mm3      RBC 3.00 10*6/mm3      Hemoglobin 9.4 g/dL      Hematocrit 27.6 %      MCV 92.0 fL      MCH 31.3 pg      MCHC 34.1 g/dL      RDW 13.0 %      RDW-SD 43.2 fl      MPV 10.0 fL      Platelets 123 10*3/mm3      Neutrophil % 73.0 %      Lymphocyte % 17.6 %      Monocyte % 7.0 %      Eosinophil % 1.4 %      Basophil % 0.3 %      Immature Grans % 0.7 %      Neutrophils, Absolute 5.21 10*3/mm3      " Lymphocytes, Absolute 1.26 10*3/mm3      Monocytes, Absolute 0.50 10*3/mm3      Eosinophils, Absolute 0.10 10*3/mm3      Basophils, Absolute 0.02 10*3/mm3      Immature Grans, Absolute 0.05 10*3/mm3      nRBC 0.1 /100 WBC           Assessment: Adrienne Lezama is a 37 y.o. female  s/p   Patient Active Problem List   Diagnosis   • History of 2019 novel coronavirus disease (COVID-19)   • Grade II hemorrhoids   • Multigravida of advanced maternal age in third trimester   • Maternal anemia in pregnancy, antepartum   • Pregnancy   • Nuchal cord affecting delivery   • Precipitous delivery   • Advanced maternal age in multigravida       Plan:  1. Routine Postpartum care  2. Ambulation encouraged.     Infant circ today    Signed By:  Priyanka Rabago MD       November 15, 2022 11:36 EST

## 2022-11-16 VITALS
RESPIRATION RATE: 16 BRPM | BODY MASS INDEX: 28.61 KG/M2 | DIASTOLIC BLOOD PRESSURE: 67 MMHG | SYSTOLIC BLOOD PRESSURE: 106 MMHG | OXYGEN SATURATION: 100 % | WEIGHT: 178 LBS | HEART RATE: 85 BPM | TEMPERATURE: 98.6 F | HEIGHT: 66 IN

## 2022-11-16 PROCEDURE — 99231 SBSQ HOSP IP/OBS SF/LOW 25: CPT | Performed by: OBSTETRICS & GYNECOLOGY

## 2022-11-16 RX ORDER — IBUPROFEN 600 MG/1
600 TABLET ORAL EVERY 6 HOURS PRN
Qty: 20 TABLET | Refills: 0 | Status: SHIPPED | OUTPATIENT
Start: 2022-11-16

## 2022-11-16 RX ORDER — HYDROCORTISONE 25 MG/G
1 CREAM TOPICAL 2 TIMES DAILY
Qty: 28 G | Refills: 1 | Status: SHIPPED | OUTPATIENT
Start: 2022-11-16

## 2022-11-16 RX ORDER — PSEUDOEPHEDRINE HCL 30 MG
100 TABLET ORAL 2 TIMES DAILY
Qty: 60 CAPSULE | Refills: 0 | Status: SHIPPED | OUTPATIENT
Start: 2022-11-16

## 2022-11-16 RX ADMIN — DOCUSATE SODIUM 100 MG: 100 CAPSULE, LIQUID FILLED ORAL at 00:17

## 2022-11-16 RX ADMIN — IBUPROFEN 600 MG: 600 TABLET, FILM COATED ORAL at 00:17

## 2022-11-16 RX ADMIN — IBUPROFEN 600 MG: 600 TABLET, FILM COATED ORAL at 08:55

## 2022-11-16 RX ADMIN — DOCUSATE SODIUM 100 MG: 100 CAPSULE, LIQUID FILLED ORAL at 08:55

## 2022-11-16 NOTE — PAYOR COMM NOTE
"[facility]   &  [facility] Milly Martinez  [address]    [address] [facility] [place] [address]    [place] [address]    Maldonado Swanson - [phone]  Utilization Review/Room Reservations  Phone: [phone], [phone], [phone] or [phone]  Fax: [phone]  Email: [email or web address]  Please call, fax back, or email with authorization or any questions! Thanks!      REQUESTED CLINICAL  AUTH#[id number]        This fax contains any of the following:  Face Sheet, H&P, progress notes, consults, orders, meds, lab results, labor record, vitals, delivery worksheet, op note, d/c summary.  The information contained in this fax is confidential for the use of the Individual or entity named above. If the reader of this message is not the Intended recipient (or the employee or agent responsible to deliver it to the Intended recipient), you are hereby notified that any dissemination, distribution, or copy of this communication is prohibited. If you have received this communication in error, please notify us by collect telephone call and return the original message to us at the above address at our expense.  Adrienne Lezama (37 y.o. Female)     Date of Birth   1985    Social Security Number       Address   [address] [id number]    Home Phone   [phone]    N   [id number]       Sikhism   None    Marital Status   Single                            Admission Date   11/14/22    Admission Type   Emergency    Admitting Provider   Basim Mead MD    Attending Provider   Basim Mead MD    Department, Room/Bed   [facility], E349/1       Discharge Date       Discharge Disposition   Home or Self Care    Discharge Destination                               Attending Provider: Basim Mead MD    Allergies: No Known Allergies    Isolation: None   Infection: None   Code Status: CPR    Ht: 167.6 cm (65.98\")   Wt: 80.7 " kg (178 lb)    Admission Cmt: None   Principal Problem: Pregnancy [Z34.90]                 Active Insurance as of 2022     Primary Coverage     Payor Plan Insurance Group Employer/Plan Group    ANTHEM MEDICAID ANTHEM MEDICAID [id number]     Payor Plan Address Payor Plan Phone Number Payor Plan Fax Number Effective Dates    [address] [address] [phone]  2019 - None Entered    [facility] [id number]       Subscriber Name Subscriber Birth Date Member ID       ADRIENNE MG 1985 [id number]                 Emergency Contacts      (Rel.) Home Phone Work Phone Mobile Phone    contact,no (Other) [phone] -- --               History & Physical      Link, Basim ROWE MD at 22 1004          History and physical    Admission date 2022     Patient: Adrienne Mg MRN: [id number]   YOB: 1985 Age: 37 y.o. Sex: female     Chief Complaint   Patient presents with   • Laboring     SROM @ 0030.  Contractions started about the same time       HPI:    Adrienne Mg is a 37 y.o.,  AT 38w3d admitted for evaluation of labor.  Patient was apparently seen in ED found to have rupture membranes.  She was then sent for admission to regular labor and delivery and I received a call notifying me of her admission at the same time she was actively delivering her baby.  Patient is accompanied by her  and Reji.  Apparently they labored at home for a while.  As I arrived placenta had just delivered and patient appeared stable    Patient Active Problem List   Diagnosis   • History of 2019 novel coronavirus disease (COVID-19)   • Grade II hemorrhoids   • Multigravida of advanced maternal age in third trimester   • Maternal anemia in pregnancy, antepartum   • Pregnancy   • Nuchal cord affecting delivery   • Precipitous delivery   • Advanced maternal age in multigravida     OB History    Para Term  AB Living   5 5 5 0 0 5   SAB IAB Ectopic Molar Multiple Live  Births   0 0 0 0 0 5      # Outcome Date GA Lbr Woody/2nd Weight Sex Delivery Anes PTL Lv   5 Term 11/14/22 38w3d 04:10 / 00:01 3460 g (7 lb 10.1 oz) M Vag-Spont None N MAHI      Birth Comments: Infant scale #4      Complications: Precipitant delivery   4 Term 04/10/21 39w1d 00:15 / 00:06 3635 g (8 lb 0.2 oz) M Vag-Spont None N MAHI      Birth Comments: scale 4   3 Term 2018   3232 g (7 lb 2 oz) F Vaginal unsp   MAHI   2 Term 2016   3714 g (8 lb 3 oz) M Vaginal unsp   MAHI   1 Term 2014   3487 g (7 lb 11 oz) F Vaginal unsp   MAHI      Obstetric Comments   DES, BOBBI, AND VENKATA      History reviewed. No pertinent past medical history.  Past Surgical History:   Procedure Laterality Date   • HEMORRHOIDECTOMY     • WISDOM TOOTH EXTRACTION       No current facility-administered medications on file prior to encounter.     Current Outpatient Medications on File Prior to Encounter   Medication Sig Dispense Refill   • aspirin (aspirin) 81 MG EC tablet Take 1 tablet by mouth Daily. 90 tablet 2   • ferrous gluconate (FERGON) 324 MG tablet Take 324 mg by mouth Daily With Breakfast.     • prenatal vitamin (prenatal, CLASSIC, vitamin) tablet Take  by mouth Daily.         ROS:      Except as outlined in history of physical illness, patient denies any changes in her GYN, , GI systems. All other systems reviewed are negative.      OBJECTIVE:     Vitals:   Vitals:    11/14/22 0700 11/14/22 0736 11/14/22 0739 11/14/22 0850   BP: 104/63 (!) 71/50 92/67 111/64   BP Location:   Right arm Right arm   Patient Position:   Lying Lying   Pulse: 90 93 84 80   Resp:   18 18   Temp:    97.5 °F (36.4 °C)   TempSrc:    Oral   SpO2: 100%      Weight:       Height:             Appearance/Psychiatric: In no distress   Constitutional: The patient is well nourished   Cardiovascular: She does not have edema. Heart RRR  Respiratory: Respiratory effort is normal. CTAB   Abdomen: Soft, gravid.  Ext: nontender, no edema. +2/4 bilateral patellar reflexes   Cx;  was intact without any tears or lacerations but there were multiple clots.  Vagina showed a first-degree midline scrape which was repaired with 3-0 Vicryl  Uterus was slow to contract as patient had declined Pitocin     LOS: 0 days    Basim Mead MD   November 14, 2022    Assessment and Plan:   Pregnancy [Z34.90]  Advanced maternal age in multigravida [O09.529]  Precipitous delivery  First-degree vaginal laceration  Uterine atony        Electronically signed by Basim Mead MD at 11/14/22 1006          Operative/Procedure Notes (last 72 hours)      Basim Mead MD at 11/14/22 0545          [facility]  Vaginal Delivery Note    Delivery     Delivery: Vaginal, Spontaneous    Active Hospital Problems    Diagnosis  POA   • **Pregnancy [Z34.90]  Not Applicable   • Nuchal cord affecting delivery [O69.81X0]  Unknown   • Precipitous delivery [O62.3]  Unknown   • Advanced maternal age in multigravida [O09.529]  Yes      YOB: 2022    Time of Birth: 4:41 AM      Anesthesia: epidural    Delivering clinician: MARISSA Mead    Pre-op dg advanced maternal age, spontaneous rupture membranes, active labor  Post-op precipitous delivery, uterine atony, nuchal cord,                  Delivery narrative: Patient had spontaneous rupture membranes at home, she decided to labor for a while at home rather than arrived to the hospital as previously discussed.  She was seen initially in ED and confirmed to have rupture membranes thought to be 3 to 4 cm.  She was admitted.  Apparently contractions picked up quickly, I was notified of her inpatient admission by telephone as the patient was actively delivering.  Patient was said to have spontaneous delivered her male infant with nursing staff in attendance.  There was described a loose nuchal cord.  When I arrived infant was vigorous moving all 4 extremities well.  Placenta had just spontaneously delivered.              Infant was being assessed by nursery.  Patient had her   and Dula accompanying her.  She declined administration of Pitocin.  Despite fundal massage she continued to have some bleeding.  Skin to skin contact between the mother and infant was underway.  There was a first-degree midline scrape that was oozing as well.  Some local anesthetic was administered to this area and that midline scrape was closed with 3-0 Vicryl in a figure-of-eight fashion.  There was no more bleeding from that area but she continued to have uterine atony.  Bimanual massage was performed.  Cytotec was administered.  With gentle bimanual massage and a little time the uterus slowly contracted.  We watched patient and observe for any further bleeding for about 15 minutes.  Uterus was contracted nicely at this time.  No further bleeding was seen               all counts were correct      Infant    Findings: male  infant  38w3d    Weight: No birth weight on file.   Length:   in  Observations/Comments:          @ 1 minute /     @ 5 minutes      Infant observations:            Infant Name:      Placenta, Cord, and Fluid    Placenta delivered  Spontaneous  at  2022  4:48 AM     Cord: 3 vessels  present.   Nuchal Cord?  yes; Number of nuchal loops present:      Cord blood obtained: yes                 Repair    Episiotomy: No   Lacerations:  Laceration first-degree midline 1/2 inch   Estimated Blood Loss: See EMR for quantitative blood loss          Complications:                    None          Disposition  Mother and baby were stable and doing well.    Basim Mead MD  22  05:45 EST        Electronically signed by Basim Mead MD at 22 0552          Physician Progress Notes (last 72 hours)      Lynn Sevilla MD at 22 1045          [facility]   Obstetrics and Gynecology     2022    Name:Adrienne Lezama   MR#:[id number]    Vaginal Delivery Progress Note    HD#2    Subjective   Postpartum Day 2: 37 y.o. female  delivered at 38w3d   delivered a male  infant.     The patient feels well.  Her pain is controlled.    She is ambulating well.  Patient describes her bleeding as thin lochia.    Breastfeeding/bottle:  The patient is currently breastfeeding.    Patient Active Problem List   Diagnosis   • History of 2019 novel coronavirus disease (COVID-19)   • Grade II hemorrhoids   • Multigravida of advanced maternal age in third trimester   • Maternal anemia in pregnancy, antepartum   • Pregnancy   • Nuchal cord affecting delivery   • Precipitous delivery   • Advanced maternal age in multigravida       Objective   Vital Signs Range for the last 24 hours  Temp: Temp:  [98.4 °F (36.9 °C)] 98.4 °F (36.9 °C) Temp src: Oral   BP: BP: (101-112)/(62-67) 101/62        Pulse: Heart Rate:  [76] 76  RR: Resp:  [18] 18  Weight: 80.7 kg (178 lb)  BMI:  Body mass index is 28.74 kg/m².    Results from last 7 days   Lab Units 11/15/22  0514 11/14/22  0802   WBC 10*3/mm3 7.14 11.58*   HEMOGLOBIN g/dL 9.4* 10.6*   HEMATOCRIT % 27.6* 31.2*   PLATELETS 10*3/mm3 123* 123*     Results from last 7 days   Lab Units 11/14/22  0802   SODIUM mmol/L 133*   POTASSIUM mmol/L 3.7   CHLORIDE mmol/L 103   CO2 mmol/L 21.3*   BUN mg/dL 7   CREATININE mg/dL 0.52*   CALCIUM mg/dL 8.3*   ALK PHOS U/L 77   ALT (SGPT) U/L 21   AST (SGOT) U/L 28   GLUCOSE mg/dL 127*       Physical Exam  Vitals and nursing note reviewed.   Constitutional:       Appearance: Normal appearance. She is normal weight.   Pulmonary:      Effort: Pulmonary effort is normal.   Neurological:      Mental Status: She is alert and oriented to person, place, and time.   Psychiatric:         Mood and Affect: Mood normal.         Behavior: Behavior normal.         Assessment/Plan   1.  PPD# 2    Plan:   Discharge today     Lynn Sevilla MD  11/16/2022 10:46 EST      Electronically signed by Lynn Sevilla MD at 11/16/22 1046     Priyanka Rabago MD at 11/15/22 1136          Postpartum Vaginal Delivery Note  "PPD#1    CC: PPD 1 s/p     Assessment:   Pt doing well. Pain well controlled. Lochia normal. Ambulating well. Tolerating regular diet. Voiding without difficulty. No complaints.  Desires infant circ- RBA discussed    Review of Systems - Negative except cramping    Vitals:   /67 (BP Location: Right arm, Patient Position: Sitting)   Pulse 85   Temp 97.9 °F (36.6 °C) (Oral)   Resp 18   Ht 167.6 cm (65.98\")   Wt 80.7 kg (178 lb)   LMP 2022 (Approximate)   SpO2 100%   Breastfeeding Yes   BMI 28.74 kg/m²         Exam:   Gen: NAD, cooperative, conversive  Neck:  No LAD or TM  Lungs: non labored breathing  Heart:  RRR  Abd: Soft, nondistended, fundus is firm below umbillicus, approp tender  Ext: Nontender bilaterally, trace edema bilateral    Labs:  Lab Results (last 24 hours)     Procedure Component Value Units Date/Time    CBC & Differential [[id number]]  (Abnormal) Collected: 11/15/22 0514    Specimen: Blood Updated: 11/15/22 0707    Narrative:      The following orders were created for panel order CBC & Differential.  Procedure                               Abnormality         Status                     ---------                               -----------         ------                     CBC Auto Differential[[id number]]        Abnormal            Final result                 Please view results for these tests on the individual orders.    CBC Auto Differential [[id number]]  (Abnormal) Collected: 11/15/22 0514    Specimen: Blood Updated: 11/15/22 0707     WBC 7.14 10*3/mm3      RBC 3.00 10*6/mm3      Hemoglobin 9.4 g/dL      Hematocrit 27.6 %      MCV 92.0 fL      MCH 31.3 pg      MCHC 34.1 g/dL      RDW 13.0 %      RDW-SD 43.2 fl      MPV 10.0 fL      Platelets 123 10*3/mm3      Neutrophil % 73.0 %      Lymphocyte % 17.6 %      Monocyte % 7.0 %      Eosinophil % 1.4 %      Basophil % 0.3 %      Immature Grans % 0.7 %      Neutrophils, Absolute 5.21 10*3/mm3      Lymphocytes, Absolute 1.26 10*3/mm3 "      Monocytes, Absolute 0.50 10*3/mm3      Eosinophils, Absolute 0.10 10*3/mm3      Basophils, Absolute 0.02 10*3/mm3      Immature Grans, Absolute 0.05 10*3/mm3      nRBC 0.1 /100 WBC           Assessment: Adrienne Lezama is a 37 y.o. female  s/p   Patient Active Problem List   Diagnosis   • History of 2019 novel coronavirus disease (COVID-19)   • Grade II hemorrhoids   • Multigravida of advanced maternal age in third trimester   • Maternal anemia in pregnancy, antepartum   • Pregnancy   • Nuchal cord affecting delivery   • Precipitous delivery   • Advanced maternal age in multigravida       Plan:  1. Routine Postpartum care  2. Ambulation encouraged.     Infant circ today    Signed By:  Priyanka Rabago MD       November 15, 2022 11:36 EST         Electronically signed by Priyanka Rabago MD at 11/15/22 1136     Melania Gutierrez MD at 22 0501          [facility]  Vaginal Delivery Note    Delivery     Delivery:      YOB: 2022    Time of Birth:  Gestational Age 4:41 AM   38w3d     Anesthesia:      Delivering clinician:  none   Forceps?   No   Vacuum? No    Shoulder dystocia present: No        Delivery narrative:  I was called to the room as the fetus was being delivered by RN upon my arrival.  The patient underwent a spontaneous vaginal delivery of a viable male  at 0441 hrs.  The infant was bulb suctioned. Nuchal cord x 1, reduced. The cord was clamped x2 and cut.  The placenta was delivered spontaneously and intact.  The weight was 3460 g and the Apgars were 6 and 8.   The vaginal and cervical exams were within normal limits.  The infant was in the warmer and mom was in recovery in stable and satisfactory condition.  The sponge counts and instrument counts were verified as correct.    Infant    Findings: male  infant     Infant observations: Weight: No birth weight on file.   Length:   in  Observations/Comments:        Apgars:   @ 1 minute /6      @ 5 minutes/8          Placenta, Cord, and Fluid    Placenta delivered    at     0442   Cord:   present.   Nuchal Cord?  yes; Number of nuchal loops present: 1     Cord blood obtained:     Cord gases obtained:      Cord gas results: Venous:  No results found for: PHCVEN    Arterial:  No results found for: PHCART     Repair    Episiotomy: Not recorded     No    Lacerations: No   Estimated Blood Loss:           Complications  none    Disposition  Mother to Mother Baby/Postpartum  in stable condition currently.  Baby to remains with mom in stable condition currently.    Electronically signed by Melania Gutierrez MD, 11/14/22, 5:04 AM EST.       Electronically signed by Melania Gutierrez MD at 11/14/22 0516             Maternal Vitals (last 3 days)     Date/Time Temp Pulse Resp BP SpO2 Weight    11/15/22 2316 98.4 (36.9) 76 18 101/62 -- --    11/15/22 1607 98.4 (36.9) 76 18 112/67 -- --    11/15/22 0900 97.9 (36.6) 85 18 107/67 -- --    11/14/22 2315 98.7 (37.1) 83 18 101/59 -- --    11/14/22 1500 98.4 (36.9) 85 18 103/55 -- --    11/14/22 1040 98.5 (36.9) 83 18 100/57 -- --    11/14/22 0920 98.8 (37.1) 78 18 104/64 -- --    11/14/22 0850 97.5 (36.4) 80 18 111/64 -- --    11/14/22 0739 -- 84 18 92/67 -- --    11/14/22 0736 -- 93 -- 71/50 -- --    11/14/22 0700 -- 90 -- 104/63 100 --    11/14/22 0625 -- 82 -- -- 100 --    11/14/22 0621 -- 92 -- 113/52 -- --    11/14/22 0620 -- 92 -- -- 100 --    11/14/22 0616 -- 89 -- 107/60 -- --    11/14/22 0615 -- 84 -- -- 100 --    11/14/22 0614 -- 84 -- 110/62 100 --    11/14/22 0610 -- 85 -- -- 99 --    11/14/22 0605 -- 98 -- -- 100 --    11/14/22 0604 -- 98 -- -- 100 --    11/14/22 0602 -- 98 -- 95/50 -- --    11/14/22 0600 -- 91 -- -- 100 --    11/14/22 0545 -- 104 -- 102/62 99 --    11/14/22 0530 -- 95 -- 108/68 -- --    11/14/22 0529 -- 119 -- 113/70 -- --    11/14/22 0521 -- 81 -- 111/60 -- --    11/14/22 0515 -- 95 -- 123/71 -- --    11/14/22 0510 -- 99 -- 118/70 -- --    11/14/22 0506 -- 97 --  104/69 -- --    11/14/22 0503 -- 95 -- 115/81 -- --    11/14/22 0500 -- 106 -- 97/60 -- --    11/14/22 0459 -- 90 -- 115/64 -- --    11/14/22 0456 -- 91 -- 108/66 -- --    11/14/22 0454 -- 88 -- 104/72 -- --    11/14/22 0453 -- 88 -- 101/74 -- --    11/14/22 0450 -- 88 -- 111/61 -- --    11/14/22 0330 98.2 (36.8) -- 18 -- -- --    11/14/22 0253 -- -- -- -- -- 80.7 (178)        FHR (last 3 days)     Date/Time Fetal HR Assessment Method Fetal HR (beats/min) Fetal HR Baseline Fetal HR Variability Fetal HR Accelerations Fetal HR Decelerations Fetal HR Tracing Category    11/14/22 0430 external 135 normal range moderate (amplitude range 6 to 25 bpm) absent variable;early --    11/14/22 0400 external 145 normal range moderate (amplitude range 6 to 25 bpm) absent variable;early --    11/14/22 0330 external 150 normal range moderate (amplitude range 6 to 25 bpm) absent variable --        Uterine Activity (last 3 days)     Date/Time Method Contraction Frequency (Minutes) Contraction Duration (sec) Contraction Intensity Uterine Resting Tone Contraction Pattern    11/14/22 0430 external tocotransducer 2-5  moderate by palpation soft by palpation --    11/14/22 0400 external tocotransducer 2-5 60-90 moderate by palpation soft by palpation --    11/14/22 0330 external tocotransducer 2-3 60-90 moderate by palpation soft by palpation --        Labor Pain (last 3 days)     Date/Time (0-10) Pain Rating: Rest (0-10) Pain Rating: Activity Pain Management Interventions    11/16/22 0855 5 -- see MAR    11/16/22 0610 1 -- pain management plan reviewed with patient/caregiver    11/16/22 0400 1 -- pain management plan reviewed with patient/caregiver    11/16/22 0205 1 -- pain management plan reviewed with patient/caregiver    11/16/22 0017 3 -- see MAR    11/15/22 2230 1 -- pain management plan reviewed with patient/caregiver    11/15/22 2000 1 -- pain management plan reviewed with patient/caregiver    11/15/22 1824 3 -- --     11/15/22 1050 4 -- --    11/15/22 1000 6 -- see MAR    11/15/22 0855 1 -- --    11/15/22 0005 2 -- pain management plan reviewed with patient/caregiver    11/14/22 2201 6 7 see MAR    11/14/22 2000 4 -- see MAR    11/14/22 1415 1 -- see MAR    11/14/22 1223 6 -- pain management plan reviewed with patient/caregiver;cold applied    11/14/22 1123 8 -- see MAR    11/14/22 0739 2 2 --    11/14/22 0720 2 2 --    11/14/22 0635 1 1 --    11/14/22 0620 1 1 --    11/14/22 0605 2 2 --    11/14/22 0550 2 2 --    11/14/22 0535 2 2 --    11/14/22 0520 2 2 --    11/14/22 0505 2 2 pain management plan reviewed with patient/caregiver;medication offered but refused    11/14/22 0450 2 2 --    11/14/22 0414 6 6 heat applied;breathing exercises;pillow support provided;massage provided;medication offered but refused;quiet environment facilitated;position adjusted;pain management plan reviewed with patient/caregiver;relaxation techniques promoted    11/14/22 0330 -- 6 --

## 2022-11-16 NOTE — PROGRESS NOTES
[facility]   Obstetrics and Gynecology     2022    Name:Adrienne Lezama   MR#:[id number]    Vaginal Delivery Progress Note    HD#2    Subjective   Postpartum Day 2: 37 y.o. female  delivered at 38w3d  delivered a male  infant.     The patient feels well.  Her pain is controlled.    She is ambulating well.  Patient describes her bleeding as thin lochia.    Breastfeeding/bottle:  The patient is currently breastfeeding.    Patient Active Problem List   Diagnosis   • History of 2019 novel coronavirus disease (COVID-19)   • Grade II hemorrhoids   • Multigravida of advanced maternal age in third trimester   • Maternal anemia in pregnancy, antepartum   • Pregnancy   • Nuchal cord affecting delivery   • Precipitous delivery   • Advanced maternal age in multigravida       Objective   Vital Signs Range for the last 24 hours  Temp: Temp:  [98.4 °F (36.9 °C)] 98.4 °F (36.9 °C) Temp src: Oral   BP: BP: (101-112)/(62-67) 101/62        Pulse: Heart Rate:  [76] 76  RR: Resp:  [18] 18  Weight: 80.7 kg (178 lb)  BMI:  Body mass index is 28.74 kg/m².    Results from last 7 days   Lab Units 11/15/22  0514 22  0802   WBC 10*3/mm3 7.14 11.58*   HEMOGLOBIN g/dL 9.4* 10.6*   HEMATOCRIT % 27.6* 31.2*   PLATELETS 10*3/mm3 123* 123*     Results from last 7 days   Lab Units 22  0802   SODIUM mmol/L 133*   POTASSIUM mmol/L 3.7   CHLORIDE mmol/L 103   CO2 mmol/L 21.3*   BUN mg/dL 7   CREATININE mg/dL 0.52*   CALCIUM mg/dL 8.3*   ALK PHOS U/L 77   ALT (SGPT) U/L 21   AST (SGOT) U/L 28   GLUCOSE mg/dL 127*       Physical Exam  Vitals and nursing note reviewed.   Constitutional:       Appearance: Normal appearance. She is normal weight.   Pulmonary:      Effort: Pulmonary effort is normal.   Neurological:      Mental Status: She is alert and oriented to person, place, and time.   Psychiatric:         Mood and Affect: Mood normal.         Behavior: Behavior normal.         Assessment/Plan   1.  PPD#  2    Plan:   Discharge today     Lynn Sevilla MD  11/16/2022 10:46 EST

## 2022-12-19 ENCOUNTER — POSTPARTUM VISIT (OUTPATIENT)
Dept: OBSTETRICS AND GYNECOLOGY | Age: 37
End: 2022-12-19

## 2022-12-19 VITALS
SYSTOLIC BLOOD PRESSURE: 112 MMHG | HEIGHT: 66 IN | WEIGHT: 161.6 LBS | DIASTOLIC BLOOD PRESSURE: 62 MMHG | BODY MASS INDEX: 25.97 KG/M2

## 2022-12-19 DIAGNOSIS — K64.1 GRADE II HEMORRHOIDS: ICD-10-CM

## 2022-12-19 DIAGNOSIS — Z12.4 SCREENING FOR CERVICAL CANCER: ICD-10-CM

## 2022-12-19 PROBLEM — O09.523 MULTIGRAVIDA OF ADVANCED MATERNAL AGE IN THIRD TRIMESTER: Status: RESOLVED | Noted: 2021-04-07 | Resolved: 2022-12-19

## 2022-12-19 PROBLEM — Z34.90 PREGNANCY: Status: RESOLVED | Noted: 2021-04-10 | Resolved: 2022-12-19

## 2022-12-19 PROBLEM — O99.019 MATERNAL ANEMIA IN PREGNANCY, ANTEPARTUM: Status: RESOLVED | Noted: 2021-04-07 | Resolved: 2022-12-19

## 2022-12-19 PROBLEM — Z86.16 HISTORY OF 2019 NOVEL CORONAVIRUS DISEASE (COVID-19): Status: RESOLVED | Noted: 2020-10-28 | Resolved: 2022-12-19

## 2022-12-19 PROBLEM — O09.529 ADVANCED MATERNAL AGE IN MULTIGRAVIDA: Status: RESOLVED | Noted: 2022-11-14 | Resolved: 2022-12-19

## 2022-12-19 PROCEDURE — 99213 OFFICE O/P EST LOW 20 MIN: CPT | Performed by: OBSTETRICS & GYNECOLOGY

## 2022-12-19 NOTE — PROGRESS NOTES
"Subjective     Chief Complaint   Patient presents with   • Postpartum Care     PP 5 wks  2022 male \"Serafin\", Breast eeding, 1st degree midline 1/2 inch, No problems today       Baby's name: Serafin Lezama is a 37 y.o. female who presents for a postpartum visit. She is 5 weeks postpartum following a spontaneous vaginal delivery.  Precipitous. I have fully reviewed the prenatal and intrapartum course. Postpartum course has been uneventful. Baby is feeding by breast. Bleeding has been normal in amount and decreasing.. Bowel function is normal. Bladder function is normal. Patient not sexually active at this time. Contraception method is discussed. Postpartum depression screening: negative.    The following portions of the patient's history were reviewed and updated as appropriate: allergies, current medications, past family history, past medical history, past social history, past surgical history and problem list.    Review of Systems  Pertinent items are noted in HPI.    Objective   /62   Ht 167.6 cm (66\")   Wt 73.3 kg (161 lb 9.6 oz)   Breastfeeding Yes   BMI 26.08 kg/m²    General:  alert    Breasts:  normal       Heart:  regular rate and rhytm   Abdomen: Normal findings    Vulva:  normal   rectal exam reveals grade 2 hemorrhoids nonthrombosed no bleeding   Vagina: normal   Cervix:  Normal with no cervical motion tenderness   Corpus: Normal for post partum visit   Adnexa:  Non tender, non enlarged         Diagnoses and all orders for this visit:    1. Postpartum follow-up (Primary)    2. Screening for cervical cancer  -     IGP, Apt HPV,rfx 16 / 18,45    3. Grade II hemorrhoids      Patient has follow-up appointment scheduled with gastroenterology     Normal postpartum exam. Pap smear done at today's visit.     Contraception: discussed, utilizes NFP slow return to normal activities reviewed. Continue prenatal vitamins.   Follow up in 12 months  Or sooner  as needed.    Problem " List Items Addressed This Visit        Gastrointestinal Abdominal     Grade II hemorrhoids   Other Visit Diagnoses     Postpartum follow-up    -  Primary    Screening for cervical cancer        Relevant Orders    IGP, Apt HPV,rfx 16 / 18,45               EMR Dragon/ Transcription disclaimer:  Much of the encounter note is an electronic transcription/translation of spoken language to printed text. The electronic translation of spoken language may permit erroneous, or at times, nonessential words or phrases to be inadvertently transcribes; Although i have reviewed the note for such errors, some may still exist.

## 2022-12-28 LAB
CYTOLOGIST CVX/VAG CYTO: NORMAL
CYTOLOGY CVX/VAG DOC CYTO: NORMAL
CYTOLOGY CVX/VAG DOC THIN PREP: NORMAL
DX ICD CODE: NORMAL
HIV 1 & 2 AB SER-IMP: NORMAL
HPV I/H RISK 4 DNA CVX QL PROBE+SIG AMP: NORMAL
OTHER STN SPEC: NORMAL
REQUEST PROBLEM: NORMAL
STAT OF ADQ CVX/VAG CYTO-IMP: NORMAL

## 2023-02-25 NOTE — LACTATION NOTE
This note was copied from a baby's chart.  P5,T baby- new admission this AM. Mom BF her previous children for about 1 year each.Informed PT that LC is here to help with BF today until 2300. Offered assistance but mother declined, said she will call later, when baby is due to BF if she needs help. Reports infant is latching well so far.PT denies any questions and concerns at this time. She will need new PBP, so LC will fax the order. Encouraged to call LC if needing further assistance.   no

## 2023-05-09 ENCOUNTER — TELEPHONE (OUTPATIENT)
Dept: GASTROENTEROLOGY | Facility: CLINIC | Age: 38
End: 2023-05-09
Payer: MEDICAID

## 2023-05-09 ENCOUNTER — OFFICE VISIT (OUTPATIENT)
Dept: GASTROENTEROLOGY | Facility: CLINIC | Age: 38
End: 2023-05-09
Payer: MEDICAID

## 2023-05-09 VITALS
OXYGEN SATURATION: 96 % | WEIGHT: 144.4 LBS | BODY MASS INDEX: 23.21 KG/M2 | DIASTOLIC BLOOD PRESSURE: 69 MMHG | TEMPERATURE: 97.4 F | HEIGHT: 66 IN | HEART RATE: 91 BPM | SYSTOLIC BLOOD PRESSURE: 101 MMHG

## 2023-05-09 DIAGNOSIS — K62.5 RECTAL BLEEDING: Primary | ICD-10-CM

## 2023-05-09 PROCEDURE — 99213 OFFICE O/P EST LOW 20 MIN: CPT | Performed by: INTERNAL MEDICINE

## 2023-05-09 PROCEDURE — 1160F RVW MEDS BY RX/DR IN RCRD: CPT | Performed by: INTERNAL MEDICINE

## 2023-05-09 PROCEDURE — 1159F MED LIST DOCD IN RCRD: CPT | Performed by: INTERNAL MEDICINE

## 2023-05-09 RX ORDER — HYDROCORTISONE 25 MG/G
1 CREAM TOPICAL 2 TIMES DAILY
Qty: 28 G | Refills: 1 | Status: SHIPPED | OUTPATIENT
Start: 2023-05-09

## 2023-05-09 NOTE — TELEPHONE ENCOUNTER
OK FOR HUB TO READ  PT IS SCHEDULED FOR A FLEX SIG ON 5/31/23 @ 4760 PT WAS GIVEN FLEX SIG PREP INSTRUCTIONS PT LISA SHER/ CEE

## 2023-05-09 NOTE — PROGRESS NOTES
Chief Complaint   Patient presents with   • Rectal Bleeding       Adrienne Leazma is a  37 y.o. female here for a follow up visit for rectal bleeding.    HPI     Patient 37-year-old female with history of recurrent rectal bleeding.  Patient last seen was pregnant and unable to follow through with sigmoidoscopy evaluation now status post delivery of her fifth child in November everyone otherwise is healthy here for further recommendations.  Patient denies any rectal pain.    Past Medical History:   Diagnosis Date   • Lactating mother          Current Outpatient Medications:   •  ferrous gluconate (FERGON) 324 MG tablet, Take 1 tablet by mouth Daily With Breakfast., Disp: , Rfl:   •  Hydrocortisone, Perianal, (ANUSOL-HC) 2.5 % rectal cream, Insert 1 application into the rectum 2 (Two) Times a Day. Use externally only, Disp: 28 g, Rfl: 1  •  ibuprofen (ADVIL,MOTRIN) 600 MG tablet, Take 1 tablet by mouth Every 6 (Six) Hours As Needed for Mild Pain or Moderate Pain. (Patient taking differently: Take 400 mg by mouth Every 6 (Six) Hours As Needed for Mild Pain or Moderate Pain.), Disp: 20 tablet, Rfl: 0  •  Polyethylene Glycol 3350 (MIRALAX PO), Take  by mouth., Disp: , Rfl:   •  prenatal vitamin (prenatal, CLASSIC, vitamin) tablet, Take  by mouth Daily., Disp: , Rfl:   •  Probiotic Product (PROBIOTIC DAILY PO), Take  by mouth., Disp: , Rfl:   •  docusate sodium 100 MG capsule, Take 1 capsule by mouth 2 (Two) Times a Day. (Patient not taking: Reported on 5/9/2023), Disp: 60 capsule, Rfl: 0    No Known Allergies    Social History     Socioeconomic History   • Marital status:    Tobacco Use   • Smoking status: Never   • Smokeless tobacco: Never   Vaping Use   • Vaping Use: Never used   Substance and Sexual Activity   • Alcohol use: Not Currently     Comment: social otherwise    • Drug use: Never   • Sexual activity: Yes     Partners: Male     Birth control/protection: None     Comment: SPOUSE = RADHA        Family  History   Problem Relation Age of Onset   • Colon cancer Paternal Grandmother    • No Known Problems Daughter    • No Known Problems Daughter    • No Known Problems Son        Review of Systems   Constitutional: Negative.    Respiratory: Negative.    Cardiovascular: Negative.    Gastrointestinal: Positive for anal bleeding. Negative for abdominal distention, abdominal pain, constipation, diarrhea, nausea, rectal pain and vomiting.   Musculoskeletal: Negative.    Skin: Negative.    Hematological: Negative.        Vitals:    05/09/23 1134   BP: 101/69   Pulse: 91   Temp: 97.4 °F (36.3 °C)   SpO2: 96%       Physical Exam  Vitals reviewed.   Constitutional:       Appearance: Normal appearance. She is well-developed and normal weight.   HENT:      Head: Normocephalic and atraumatic.   Eyes:      General: No scleral icterus.     Pupils: Pupils are equal, round, and reactive to light.   Cardiovascular:      Rate and Rhythm: Normal rate and regular rhythm.      Heart sounds: No murmur heard.    No friction rub. No gallop.   Pulmonary:      Effort: Pulmonary effort is normal. No respiratory distress.      Breath sounds: Normal breath sounds. No wheezing or rales.   Abdominal:      General: Bowel sounds are normal. There is no distension.      Palpations: Abdomen is soft.      Tenderness: There is no abdominal tenderness.   Skin:     General: Skin is warm and dry.      Coloration: Skin is not jaundiced.      Findings: No rash.   Neurological:      General: No focal deficit present.      Mental Status: She is alert and oriented to person, place, and time.      Cranial Nerves: No cranial nerve deficit.   Psychiatric:         Mood and Affect: Mood normal.         Behavior: Behavior normal.         Thought Content: Thought content normal.         No visits with results within 2 Month(s) from this visit.   Latest known visit with results is:   Postpartum Visit on 12/19/2022   Component Date Value Ref Range Status   • Diagnosis  12/19/2022 Comment   Final   • Specimen adequacy: 12/19/2022 Comment   Final   • Clinician Provided ICD-10: 12/19/2022 Comment   Final   • Performed by: 12/19/2022 Comment   Final   • . 12/19/2022 .   Final   • Note: 12/19/2022 Comment   Final   • Method: 12/19/2022 Comment   Final   • HPV Aptima 12/19/2022 CANCELED   Final-Edited   • Request Problem 12/19/2022 CANCELED   Final-Edited       Diagnoses and all orders for this visit:    1. Rectal bleeding (Primary)  -     Case Request; Standing  -     Implement Anesthesia orders day of procedure.; Standing  -     Obtain informed consent; Standing  -     Case Request    Other orders  -     Hydrocortisone, Perianal, (ANUSOL-HC) 2.5 % rectal cream; Insert 1 application into the rectum 2 (Two) Times a Day. Use externally only  Dispense: 28 g; Refill: 1      Patient 37-year-old female with history of grade 2 hemorrhoids with recurrent rectal bleeding now status post fifth vaginal delivery of her child in November.  Patient reports still having bleeding most days.  Denies abdominal pain does have a history of anemia.  Patient was to undergo sigmoidoscopy several years ago for evaluation but has been recurrently pregnant.  Patient currently reports seeing blood about every other day but otherwise bowels more regular.  We will continue current medication refill medication for her hemorrhoids and arrange sigmoidoscopy for evaluation for further recommendations on treatment.

## 2023-05-09 NOTE — H&P (VIEW-ONLY)
Chief Complaint   Patient presents with   • Rectal Bleeding       Adrienne Lezama is a  37 y.o. female here for a follow up visit for rectal bleeding.    HPI     Patient 37-year-old female with history of recurrent rectal bleeding.  Patient last seen was pregnant and unable to follow through with sigmoidoscopy evaluation now status post delivery of her fifth child in November everyone otherwise is healthy here for further recommendations.  Patient denies any rectal pain.    Past Medical History:   Diagnosis Date   • Lactating mother          Current Outpatient Medications:   •  ferrous gluconate (FERGON) 324 MG tablet, Take 1 tablet by mouth Daily With Breakfast., Disp: , Rfl:   •  Hydrocortisone, Perianal, (ANUSOL-HC) 2.5 % rectal cream, Insert 1 application into the rectum 2 (Two) Times a Day. Use externally only, Disp: 28 g, Rfl: 1  •  ibuprofen (ADVIL,MOTRIN) 600 MG tablet, Take 1 tablet by mouth Every 6 (Six) Hours As Needed for Mild Pain or Moderate Pain. (Patient taking differently: Take 400 mg by mouth Every 6 (Six) Hours As Needed for Mild Pain or Moderate Pain.), Disp: 20 tablet, Rfl: 0  •  Polyethylene Glycol 3350 (MIRALAX PO), Take  by mouth., Disp: , Rfl:   •  prenatal vitamin (prenatal, CLASSIC, vitamin) tablet, Take  by mouth Daily., Disp: , Rfl:   •  Probiotic Product (PROBIOTIC DAILY PO), Take  by mouth., Disp: , Rfl:   •  docusate sodium 100 MG capsule, Take 1 capsule by mouth 2 (Two) Times a Day. (Patient not taking: Reported on 5/9/2023), Disp: 60 capsule, Rfl: 0    No Known Allergies    Social History     Socioeconomic History   • Marital status:    Tobacco Use   • Smoking status: Never   • Smokeless tobacco: Never   Vaping Use   • Vaping Use: Never used   Substance and Sexual Activity   • Alcohol use: Not Currently     Comment: social otherwise    • Drug use: Never   • Sexual activity: Yes     Partners: Male     Birth control/protection: None     Comment: SPOUSE = RADHA        Family  History   Problem Relation Age of Onset   • Colon cancer Paternal Grandmother    • No Known Problems Daughter    • No Known Problems Daughter    • No Known Problems Son        Review of Systems   Constitutional: Negative.    Respiratory: Negative.    Cardiovascular: Negative.    Gastrointestinal: Positive for anal bleeding. Negative for abdominal distention, abdominal pain, constipation, diarrhea, nausea, rectal pain and vomiting.   Musculoskeletal: Negative.    Skin: Negative.    Hematological: Negative.        Vitals:    05/09/23 1134   BP: 101/69   Pulse: 91   Temp: 97.4 °F (36.3 °C)   SpO2: 96%       Physical Exam  Vitals reviewed.   Constitutional:       Appearance: Normal appearance. She is well-developed and normal weight.   HENT:      Head: Normocephalic and atraumatic.   Eyes:      General: No scleral icterus.     Pupils: Pupils are equal, round, and reactive to light.   Cardiovascular:      Rate and Rhythm: Normal rate and regular rhythm.      Heart sounds: No murmur heard.    No friction rub. No gallop.   Pulmonary:      Effort: Pulmonary effort is normal. No respiratory distress.      Breath sounds: Normal breath sounds. No wheezing or rales.   Abdominal:      General: Bowel sounds are normal. There is no distension.      Palpations: Abdomen is soft.      Tenderness: There is no abdominal tenderness.   Skin:     General: Skin is warm and dry.      Coloration: Skin is not jaundiced.      Findings: No rash.   Neurological:      General: No focal deficit present.      Mental Status: She is alert and oriented to person, place, and time.      Cranial Nerves: No cranial nerve deficit.   Psychiatric:         Mood and Affect: Mood normal.         Behavior: Behavior normal.         Thought Content: Thought content normal.         No visits with results within 2 Month(s) from this visit.   Latest known visit with results is:   Postpartum Visit on 12/19/2022   Component Date Value Ref Range Status   • Diagnosis  12/19/2022 Comment   Final   • Specimen adequacy: 12/19/2022 Comment   Final   • Clinician Provided ICD-10: 12/19/2022 Comment   Final   • Performed by: 12/19/2022 Comment   Final   • . 12/19/2022 .   Final   • Note: 12/19/2022 Comment   Final   • Method: 12/19/2022 Comment   Final   • HPV Aptima 12/19/2022 CANCELED   Final-Edited   • Request Problem 12/19/2022 CANCELED   Final-Edited       Diagnoses and all orders for this visit:    1. Rectal bleeding (Primary)  -     Case Request; Standing  -     Implement Anesthesia orders day of procedure.; Standing  -     Obtain informed consent; Standing  -     Case Request    Other orders  -     Hydrocortisone, Perianal, (ANUSOL-HC) 2.5 % rectal cream; Insert 1 application into the rectum 2 (Two) Times a Day. Use externally only  Dispense: 28 g; Refill: 1      Patient 37-year-old female with history of grade 2 hemorrhoids with recurrent rectal bleeding now status post fifth vaginal delivery of her child in November.  Patient reports still having bleeding most days.  Denies abdominal pain does have a history of anemia.  Patient was to undergo sigmoidoscopy several years ago for evaluation but has been recurrently pregnant.  Patient currently reports seeing blood about every other day but otherwise bowels more regular.  We will continue current medication refill medication for her hemorrhoids and arrange sigmoidoscopy for evaluation for further recommendations on treatment.

## 2023-05-30 ENCOUNTER — TELEPHONE (OUTPATIENT)
Dept: GASTROENTEROLOGY | Facility: CLINIC | Age: 38
End: 2023-05-30

## 2023-05-30 NOTE — TELEPHONE ENCOUNTER
Caller: Adrienne Lezama    Relationship: Self    Best call back number: [phone]    Who are you requesting to speak with (clinical staff, provider,  specific staff member): CLINICAL STAFF    What was the call regarding: PT HAS QUESTIONS PRIOR TO PROC  05.31.23. SEE MYCHART NOTE 05.19.23     Is it okay if the provider responds through [employer]: THAT IS FINE. SHE JUST WANTS TO READ IT AND GET THE DIRECTIONS CORRECT PRIOR TO THE PROC.

## 2023-05-31 ENCOUNTER — ANESTHESIA (OUTPATIENT)
Dept: GASTROENTEROLOGY | Facility: HOSPITAL | Age: 38
End: 2023-05-31
Payer: MEDICAID

## 2023-05-31 ENCOUNTER — HOSPITAL ENCOUNTER (OUTPATIENT)
Facility: HOSPITAL | Age: 38
Setting detail: HOSPITAL OUTPATIENT SURGERY
Discharge: HOME | End: 2023-05-31
Attending: INTERNAL MEDICINE | Admitting: INTERNAL MEDICINE
Payer: MEDICAID

## 2023-05-31 ENCOUNTER — ANESTHESIA EVENT (OUTPATIENT)
Dept: GASTROENTEROLOGY | Facility: HOSPITAL | Age: 38
End: 2023-05-31
Payer: MEDICAID

## 2023-05-31 VITALS
SYSTOLIC BLOOD PRESSURE: 112 MMHG | OXYGEN SATURATION: 98 % | HEIGHT: 66 IN | DIASTOLIC BLOOD PRESSURE: 80 MMHG | BODY MASS INDEX: 23.18 KG/M2 | RESPIRATION RATE: 16 BRPM | HEART RATE: 66 BPM | WEIGHT: 144.2 LBS

## 2023-05-31 DIAGNOSIS — K64.1 GRADE II HEMORRHOIDS: Primary | ICD-10-CM

## 2023-05-31 DIAGNOSIS — K62.5 RECTAL BLEEDING: ICD-10-CM

## 2023-05-31 PROCEDURE — 45330 DIAGNOSTIC SIGMOIDOSCOPY: CPT | Performed by: INTERNAL MEDICINE

## 2023-05-31 RX ORDER — SODIUM CHLORIDE, SODIUM LACTATE, POTASSIUM CHLORIDE, CALCIUM CHLORIDE 600; 310; 30; 20 MG/100ML; MG/100ML; MG/100ML; MG/100ML
30 INJECTION, SOLUTION INTRAVENOUS CONTINUOUS PRN
Status: DISCONTINUED | OUTPATIENT
Start: 2023-05-31 | End: 2023-05-31 | Stop reason: HOSPADM

## 2023-05-31 RX ADMIN — SODIUM CHLORIDE, POTASSIUM CHLORIDE, SODIUM LACTATE AND CALCIUM CHLORIDE 30 ML/HR: 600; 310; 30; 20 INJECTION, SOLUTION INTRAVENOUS at 12:31

## 2023-05-31 NOTE — BRIEF OP NOTE
FLEXIBLE SIGMOIDOSCOPY WITH BIOPSY  Progress Note    Adrienne Lezama  5/31/2023    Pre-op Diagnosis:   Rectal bleeding [K62.5]       Post-Op Diagnosis Codes:     * Rectal bleeding [K62.5]     * Internal and external hemorrhoids without complication [K64.4, K64.8]    Procedure/CPT® Codes:        Procedure(s):  FLEXIBLE SIGMOIDOSCOPY              Surgeon(s):  Kar Tirado MD    Anesthesia: Monitored Anesthesia Care    Staff:   Endo Technician: Melina Hutson  Endo Nurse: Sierra Nicholson RN         Estimated Blood Loss: minimal    Urine Voided: * No values recorded between 5/31/2023  1:05 PM and 5/31/2023  1:19 PM *    Specimens:                None          Drains: * No LDAs found *    Findings: Sigmoidoscopy to the descending colon with normal colonic mucosa up to the rectum.  Retroflexed view revealed 2+ internal as well as large external hemorrhoids oozing blood from the internal hemorrhoids segment.  Exam otherwise negative.        Complications: None          Kar Tirado MD     Date: 5/31/2023  Time: 13:28 EDT

## 2023-05-31 NOTE — ANESTHESIA PREPROCEDURE EVALUATION
Anesthesia Evaluation     Patient summary reviewed and Nursing notes reviewed   NPO Solid Status: > 8 hours  NPO Liquid Status: > 8 hours           Airway   Mallampati: II  Neck ROM: full  No difficulty expected  Dental - normal exam     Pulmonary     breath sounds clear to auscultation  Cardiovascular     Rhythm: regular        Neuro/Psych  GI/Hepatic/Renal/Endo    (+)  GI bleeding ,     Musculoskeletal     Abdominal    Substance History      OB/GYN          Other                        Anesthesia Plan    ASA 2     MAC     (Lactating mother)  intravenous induction     Anesthetic plan, risks, benefits, and alternatives have been provided, discussed and informed consent has been obtained with: patient.        CODE STATUS:

## 2023-05-31 NOTE — DISCHARGE INSTRUCTIONS
For the next 24 hours patient needs to be with a responsible adult.    For 24 hours DO NOT drive, operate machinery, appliances, drink alcohol, make important decisions or sign legal documents.    Start with a light or bland diet if you are feeling sick to your stomach otherwise advance to regular diet as tolerated.    Follow recommendations on procedure report if provided by your doctor.    Call Dr Tirado for problems [phone]    Problems may include but not limited to: large amounts of bleeding, trouble breathing, repeated vomiting, severe unrelieved pain, fever or chills.
